# Patient Record
Sex: FEMALE | Race: WHITE | NOT HISPANIC OR LATINO | Employment: OTHER
[De-identification: names, ages, dates, MRNs, and addresses within clinical notes are randomized per-mention and may not be internally consistent; named-entity substitution may affect disease eponyms.]

---

## 2018-04-17 ENCOUNTER — TRANSCRIBE ORDERS (OUTPATIENT)
Dept: SCHEDULING | Age: 58
End: 2018-04-17

## 2018-04-17 DIAGNOSIS — M81.0 SENILE OSTEOPOROSIS: Primary | ICD-10-CM

## 2018-05-15 ENCOUNTER — HOSPITAL ENCOUNTER (OUTPATIENT)
Dept: RADIOLOGY | Age: 58
Discharge: HOME | End: 2018-05-15
Attending: INTERNAL MEDICINE
Payer: COMMERCIAL

## 2018-05-15 DIAGNOSIS — M81.0 SENILE OSTEOPOROSIS: ICD-10-CM

## 2018-05-15 PROCEDURE — 77080 DXA BONE DENSITY AXIAL: CPT

## 2018-08-08 ENCOUNTER — APPOINTMENT (RX ONLY)
Dept: URBAN - METROPOLITAN AREA CLINIC 31 | Facility: CLINIC | Age: 58
Setting detail: DERMATOLOGY
End: 2018-08-08

## 2018-08-08 DIAGNOSIS — L29.8 OTHER PRURITUS: ICD-10-CM

## 2018-08-08 DIAGNOSIS — L29.89 OTHER PRURITUS: ICD-10-CM

## 2018-08-08 DIAGNOSIS — L57.0 ACTINIC KERATOSIS: ICD-10-CM

## 2018-08-08 PROBLEM — D48.5 NEOPLASM OF UNCERTAIN BEHAVIOR OF SKIN: Status: ACTIVE | Noted: 2018-08-08

## 2018-08-08 PROCEDURE — ? COUNSELING

## 2018-08-08 PROCEDURE — 11100: CPT

## 2018-08-08 PROCEDURE — 11101: CPT

## 2018-08-08 PROCEDURE — ? RECOMMENDATIONS

## 2018-08-08 PROCEDURE — ? BIOPSY BY SHAVE METHOD

## 2018-08-08 PROCEDURE — ? PRESCRIPTION SAMPLES PROVIDED

## 2018-08-08 PROCEDURE — 99213 OFFICE O/P EST LOW 20 MIN: CPT | Mod: 25

## 2018-08-08 ASSESSMENT — LOCATION ZONE DERM
LOCATION ZONE: FACE
LOCATION ZONE: TRUNK

## 2018-08-08 ASSESSMENT — LOCATION DETAILED DESCRIPTION DERM
LOCATION DETAILED: LEFT CENTRAL MALAR CHEEK
LOCATION DETAILED: LEFT LATERAL MALAR CHEEK
LOCATION DETAILED: LEFT AREOLA

## 2018-08-08 ASSESSMENT — LOCATION SIMPLE DESCRIPTION DERM
LOCATION SIMPLE: LEFT CHEEK
LOCATION SIMPLE: LEFT BREAST

## 2018-08-08 NOTE — HPI: EVALUATION OF SKIN LESION(S)
Hpi Title: Evaluation of a Skin Lesion
(0) understands/communicates without difficulty
How Severe Are Your Spot(S)?: mild
Have Your Spot(S) Been Treated In The Past?: has been treated
Have Your Spot(S) Been Treated In The Past?: has not been treated

## 2018-08-08 NOTE — PROCEDURE: RECOMMENDATIONS
Detail Level: Zone
Recommendation Preamble: The following recommendations were made during the visit: sarna OTC

## 2018-08-08 NOTE — PROCEDURE: BIOPSY BY SHAVE METHOD
Depth Of Biopsy: dermis
Consent: Written consent was obtained and risks were reviewed including but not limited to scarring, infection, bleeding, scabbing, incomplete removal, nerve damage and allergy to anesthesia.
Detail Level: Detailed
Type Of Destruction Used: Curettage
Destruction After The Procedure: No
Biopsy Method: double edge Personna blade
Additional Anesthesia Volume In Cc (Will Not Render If 0): 0
Biopsy Type: H and E
Body Location Override (Optional - Billing Will Still Be Based On Selected Body Map Location If Applicable): left central malar cheek medial
Wound Care: Petrolatum
Curettage Text: The wound bed was treated with curettage after the biopsy was performed.
Notification Instructions: Patient will be notified of biopsy results. However, patient instructed to call the office if not contacted within 2 weeks.
Billing Type: Third-Party Bill
Cryotherapy Text: The wound bed was treated with cryotherapy after the biopsy was performed.
Electrodesiccation And Curettage Text: The wound bed was treated with electrodesiccation and curettage after the biopsy was performed.
Electrodesiccation Text: The wound bed was treated with electrodesiccation after the biopsy was performed.
Anesthesia Volume In Cc (Will Not Render If 0): 0.3
Render Post-Care Instructions In Note?: yes
Size Of Lesion In Cm: 0.2
Anesthesia Type: 1% lidocaine with epinephrine
Hemostasis: Drysol
Silver Nitrate Text: The wound bed was treated with silver nitrate after the biopsy was performed.
Lab: -20
Dressing: bandage
Lab Facility: [id number]
Post-Care Instructions: I reviewed with the patient in detail post-care instructions. Patient is to keep the biopsy covered and apply vaseline twice daily until healed.
Body Location Override (Optional - Billing Will Still Be Based On Selected Body Map Location If Applicable): left central malar cheek lateral
Size Of Lesion In Cm: 1

## 2019-03-20 ENCOUNTER — APPOINTMENT (RX ONLY)
Dept: URBAN - METROPOLITAN AREA CLINIC 31 | Facility: CLINIC | Age: 59
Setting detail: DERMATOLOGY
End: 2019-03-20

## 2019-03-20 DIAGNOSIS — D18.0 HEMANGIOMA: ICD-10-CM

## 2019-03-20 DIAGNOSIS — Z85.828 PERSONAL HISTORY OF OTHER MALIGNANT NEOPLASM OF SKIN: ICD-10-CM

## 2019-03-20 DIAGNOSIS — L20.89 OTHER ATOPIC DERMATITIS: ICD-10-CM

## 2019-03-20 DIAGNOSIS — Z87.2 PERSONAL HISTORY OF DISEASES OF THE SKIN AND SUBCUTANEOUS TISSUE: ICD-10-CM

## 2019-03-20 DIAGNOSIS — L82.1 OTHER SEBORRHEIC KERATOSIS: ICD-10-CM

## 2019-03-20 DIAGNOSIS — L81.4 OTHER MELANIN HYPERPIGMENTATION: ICD-10-CM

## 2019-03-20 DIAGNOSIS — D22 MELANOCYTIC NEVI: ICD-10-CM

## 2019-03-20 PROBLEM — D22.5 MELANOCYTIC NEVI OF TRUNK: Status: ACTIVE | Noted: 2019-03-20

## 2019-03-20 PROBLEM — L20.84 INTRINSIC (ALLERGIC) ECZEMA: Status: ACTIVE | Noted: 2019-03-20

## 2019-03-20 PROBLEM — D18.01 HEMANGIOMA OF SKIN AND SUBCUTANEOUS TISSUE: Status: ACTIVE | Noted: 2019-03-20

## 2019-03-20 PROCEDURE — ? ADDITIONAL NOTES

## 2019-03-20 PROCEDURE — ? INVENTORY

## 2019-03-20 PROCEDURE — ? PRESCRIPTION SAMPLES PROVIDED

## 2019-03-20 PROCEDURE — ? COUNSELING

## 2019-03-20 PROCEDURE — 99214 OFFICE O/P EST MOD 30 MIN: CPT

## 2019-03-20 ASSESSMENT — LOCATION DETAILED DESCRIPTION DERM
LOCATION DETAILED: RIGHT PROXIMAL DORSAL FOREARM
LOCATION DETAILED: SUPERIOR THORACIC SPINE
LOCATION DETAILED: INFERIOR THORACIC SPINE
LOCATION DETAILED: EPIGASTRIC SKIN
LOCATION DETAILED: LEFT CENTRAL MALAR CHEEK

## 2019-03-20 ASSESSMENT — LOCATION ZONE DERM
LOCATION ZONE: ARM
LOCATION ZONE: FACE
LOCATION ZONE: TRUNK

## 2019-03-20 ASSESSMENT — LOCATION SIMPLE DESCRIPTION DERM
LOCATION SIMPLE: UPPER BACK
LOCATION SIMPLE: RIGHT FOREARM
LOCATION SIMPLE: ABDOMEN
LOCATION SIMPLE: LEFT CHEEK

## 2019-03-20 NOTE — PROCEDURE: ADDITIONAL NOTES
Additional Notes: Pt had pathology proven AK’s from last visit on face treated by tip derm.
Detail Level: Simple

## 2020-01-14 ENCOUNTER — APPOINTMENT (RX ONLY)
Dept: URBAN - METROPOLITAN AREA CLINIC 31 | Facility: CLINIC | Age: 60
Setting detail: DERMATOLOGY
End: 2020-01-14

## 2020-01-14 DIAGNOSIS — L57.0 ACTINIC KERATOSIS: ICD-10-CM

## 2020-01-14 DIAGNOSIS — L81.4 OTHER MELANIN HYPERPIGMENTATION: ICD-10-CM

## 2020-01-14 DIAGNOSIS — L82.0 INFLAMED SEBORRHEIC KERATOSIS: ICD-10-CM

## 2020-01-14 DIAGNOSIS — L20.89 OTHER ATOPIC DERMATITIS: ICD-10-CM

## 2020-01-14 PROBLEM — L20.84 INTRINSIC (ALLERGIC) ECZEMA: Status: ACTIVE | Noted: 2020-01-14

## 2020-01-14 PROCEDURE — ? PRESCRIPTION

## 2020-01-14 PROCEDURE — ? COUNSELING

## 2020-01-14 PROCEDURE — ? PHOTO-DOCUMENTATION

## 2020-01-14 PROCEDURE — ? OBSERVATION

## 2020-01-14 PROCEDURE — ? LIQUID NITROGEN

## 2020-01-14 PROCEDURE — 17110 DESTRUCTION B9 LES UP TO 14: CPT

## 2020-01-14 PROCEDURE — 99213 OFFICE O/P EST LOW 20 MIN: CPT | Mod: 25

## 2020-01-14 PROCEDURE — 17000 DESTRUCT PREMALG LESION: CPT | Mod: 59

## 2020-01-14 PROCEDURE — 17003 DESTRUCT PREMALG LES 2-14: CPT | Mod: 59

## 2020-01-14 PROCEDURE — ? PRESCRIPTION SAMPLES PROVIDED

## 2020-01-14 RX ORDER — TRIAMCINOLONE ACETONIDE 1 MG/G
CREAM TOPICAL BID
Qty: 1 | Refills: 1 | Status: ERX | COMMUNITY
Start: 2020-01-14

## 2020-01-14 RX ADMIN — TRIAMCINOLONE ACETONIDE: 1 CREAM TOPICAL at 00:00

## 2020-01-14 ASSESSMENT — LOCATION DETAILED DESCRIPTION DERM
LOCATION DETAILED: LEFT SUPERIOR CENTRAL MALAR CHEEK
LOCATION DETAILED: NASAL DORSUM
LOCATION DETAILED: LEFT SUPERIOR UPPER BACK
LOCATION DETAILED: LEFT NASAL SIDEWALL
LOCATION DETAILED: LEFT INFERIOR VERMILION LIP
LOCATION DETAILED: RIGHT PROXIMAL DORSAL FOREARM
LOCATION DETAILED: NASAL TIP

## 2020-01-14 ASSESSMENT — LOCATION ZONE DERM
LOCATION ZONE: FACE
LOCATION ZONE: LIP
LOCATION ZONE: NOSE
LOCATION ZONE: ARM
LOCATION ZONE: TRUNK

## 2020-01-14 ASSESSMENT — LOCATION SIMPLE DESCRIPTION DERM
LOCATION SIMPLE: LEFT LIP
LOCATION SIMPLE: RIGHT FOREARM
LOCATION SIMPLE: NOSE
LOCATION SIMPLE: LEFT UPPER BACK
LOCATION SIMPLE: LEFT NOSE
LOCATION SIMPLE: LEFT CHEEK

## 2020-01-14 NOTE — PROCEDURE: LIQUID NITROGEN
Render Post-Care Instructions In Note?: yes
Post-Care Instructions: I reviewed with the patient in detail post-care instructions. Patient is to wear sunprotection, and avoid picking at any of the treated lesions. Pt may apply Vaseline to crusted or scabbing areas.
Detail Level: Detailed
Duration Of Freeze Thaw-Cycle (Seconds): 5
Render Note In Bullet Format When Appropriate: No
Consent: The patient's consent was obtained including but not limited to risks of crusting, scabbing, blistering, scarring, darker or lighter pigmentary change, recurrence, incomplete removal and infection.
Number Of Freeze-Thaw Cycles: 1 freeze-thaw cycle
Medical Necessity Clause: This procedure was medically necessary because the lesions that were treated were:
Duration Of Freeze Thaw-Cycle (Seconds): 5-10
Medical Necessity Information: It is in your best interest to select a reason for this procedure from the list below. All of these items fulfill various CMS LCD requirements except the new and changing color options.
Detail Level: Simple

## 2020-01-14 NOTE — PROCEDURE: PHOTO-DOCUMENTATION
Photo Preface (Leave Blank If You Do Not Want): Photographs were obtained today
Detail Level: Zone
Details (Free Text): 2 mm

## 2020-02-11 ENCOUNTER — APPOINTMENT (RX ONLY)
Dept: URBAN - METROPOLITAN AREA CLINIC 31 | Facility: CLINIC | Age: 60
Setting detail: DERMATOLOGY
End: 2020-02-11

## 2020-02-11 DIAGNOSIS — D485 NEOPLASM OF UNCERTAIN BEHAVIOR OF SKIN: ICD-10-CM

## 2020-02-11 DIAGNOSIS — L57.0 ACTINIC KERATOSIS: ICD-10-CM

## 2020-02-11 PROBLEM — D48.5 NEOPLASM OF UNCERTAIN BEHAVIOR OF SKIN: Status: ACTIVE | Noted: 2020-02-11

## 2020-02-11 PROCEDURE — 17003 DESTRUCT PREMALG LES 2-14: CPT

## 2020-02-11 PROCEDURE — ? LIQUID NITROGEN

## 2020-02-11 PROCEDURE — 11102 TANGNTL BX SKIN SINGLE LES: CPT

## 2020-02-11 PROCEDURE — 17000 DESTRUCT PREMALG LESION: CPT | Mod: 59

## 2020-02-11 PROCEDURE — ? COUNSELING

## 2020-02-11 PROCEDURE — ? BIOPSY BY SHAVE METHOD

## 2020-02-11 ASSESSMENT — LOCATION ZONE DERM
LOCATION ZONE: FACE
LOCATION ZONE: TRUNK
LOCATION ZONE: NOSE

## 2020-02-11 ASSESSMENT — LOCATION SIMPLE DESCRIPTION DERM
LOCATION SIMPLE: LEFT UPPER BACK
LOCATION SIMPLE: LEFT NOSE
LOCATION SIMPLE: NOSE
LOCATION SIMPLE: LEFT CHEEK

## 2020-02-11 ASSESSMENT — LOCATION DETAILED DESCRIPTION DERM
LOCATION DETAILED: NASAL DORSUM
LOCATION DETAILED: LEFT INFERIOR MEDIAL MALAR CHEEK
LOCATION DETAILED: NASAL ROOT
LOCATION DETAILED: LEFT NASAL SIDEWALL
LOCATION DETAILED: LEFT SUPERIOR MEDIAL UPPER BACK

## 2020-02-11 NOTE — PROCEDURE: LIQUID NITROGEN
Render Note In Bullet Format When Appropriate: No
Number Of Freeze-Thaw Cycles: 1 freeze-thaw cycle
Post-Care Instructions: I reviewed with the patient in detail post-care instructions. Patient is to wear sunprotection, and avoid picking at any of the treated lesions. Pt may apply Vaseline to crusted or scabbing areas.
Duration Of Freeze Thaw-Cycle (Seconds): 5
Detail Level: Detailed
Render Post-Care Instructions In Note?: yes
Consent: The patient's consent was obtained including but not limited to risks of crusting, scabbing, blistering, scarring, darker or lighter pigmentary change, recurrence, incomplete removal and infection.

## 2020-02-11 NOTE — PROCEDURE: COUNSELING
Detail Level: Zone
Patient Specific Counseling (Will Not Stick From Patient To Patient): **Discussed topical efudex and calcipotriene, patient declined at this time and will consider at next appt if necessary.**

## 2020-05-05 ENCOUNTER — HOSPITAL ENCOUNTER (OUTPATIENT)
Facility: HOSPITAL | Age: 60
Discharge: HOME | End: 2020-05-05
Attending: INTERNAL MEDICINE | Admitting: INTERNAL MEDICINE
Payer: COMMERCIAL

## 2020-05-05 VITALS
BODY MASS INDEX: 24.29 KG/M2 | DIASTOLIC BLOOD PRESSURE: 61 MMHG | TEMPERATURE: 98 F | RESPIRATION RATE: 18 BRPM | HEIGHT: 62 IN | SYSTOLIC BLOOD PRESSURE: 97 MMHG | HEART RATE: 78 BPM | OXYGEN SATURATION: 97 % | WEIGHT: 132 LBS

## 2020-05-05 DIAGNOSIS — R91.8 PULMONARY NODULES: ICD-10-CM

## 2020-05-05 DIAGNOSIS — R59.0 MEDIASTINAL LYMPHADENOPATHY: ICD-10-CM

## 2020-05-05 PROCEDURE — 87116 MYCOBACTERIA CULTURE: CPT | Performed by: HOSPITALIST

## 2020-05-05 PROCEDURE — 87102 FUNGUS ISOLATION CULTURE: CPT | Performed by: HOSPITALIST

## 2020-05-05 PROCEDURE — 87206 SMEAR FLUORESCENT/ACID STAI: CPT | Performed by: HOSPITALIST

## 2020-05-05 PROCEDURE — 87176 TISSUE HOMOGENIZATION CULTR: CPT | Performed by: HOSPITALIST

## 2020-05-05 PROCEDURE — 07D78ZX EXTRACTION OF THORAX LYMPHATIC, VIA NATURAL OR ARTIFICIAL OPENING ENDOSCOPIC, DIAGNOSTIC: ICD-10-PCS | Performed by: INTERNAL MEDICINE

## 2020-05-05 PROCEDURE — 25800000 HC PHARMACY IV SOLUTIONS: Performed by: INTERNAL MEDICINE

## 2020-05-05 PROCEDURE — 36100169

## 2020-05-05 PROCEDURE — 88185 FLOWCYTOMETRY/TC ADD-ON: CPT | Mod: 59 | Performed by: HOSPITALIST

## 2020-05-05 PROCEDURE — 27200000 HC STERILE SUPPLY

## 2020-05-05 PROCEDURE — BB4CZZZ ULTRASONOGRAPHY OF MEDIASTINUM: ICD-10-PCS | Performed by: INTERNAL MEDICINE

## 2020-05-05 PROCEDURE — 0BJ08ZZ INSPECTION OF TRACHEOBRONCHIAL TREE, VIA NATURAL OR ARTIFICIAL OPENING ENDOSCOPIC: ICD-10-PCS | Performed by: INTERNAL MEDICINE

## 2020-05-05 PROCEDURE — 36000049 HC BRONCH EBUS SAMPLING 1/2 NODE

## 2020-05-05 PROCEDURE — 87206 SMEAR FLUORESCENT/ACID STAI: CPT | Mod: 59 | Performed by: HOSPITALIST

## 2020-05-05 RX ORDER — LIDOCAINE HYDROCHLORIDE 40 MG/ML
5 SOLUTION TOPICAL ONCE
Status: DISCONTINUED | OUTPATIENT
Start: 2020-05-05 | End: 2020-05-06 | Stop reason: HOSPADM

## 2020-05-05 RX ORDER — VIT C/E/ZN/COPPR/LUTEIN/ZEAXAN 250MG-90MG
5000 CAPSULE ORAL DAILY
COMMUNITY
End: 2023-07-14

## 2020-05-05 RX ORDER — SODIUM CHLORIDE 9 MG/ML
INJECTION, SOLUTION INTRAVENOUS CONTINUOUS
Status: DISCONTINUED | OUTPATIENT
Start: 2020-05-05 | End: 2020-05-06 | Stop reason: HOSPADM

## 2020-05-05 RX ADMIN — SODIUM CHLORIDE: 9 INJECTION, SOLUTION INTRAVENOUS at 10:31

## 2020-05-05 NOTE — PROCEDURES
EBUS Bronchoscopy Report          Date: 5/5/2020    Pre-Procedure Diagnosis: Mediastinal lymphadenopathy    Post-Procedure Diagnosis: same    Procedure Start Time: 13:18    Procedure End Time:  14:02    Attending Physician: Dr. Graciela Glover DO    Sedation utilized throughout the procedure: 100 mcg of IV Fentanyl, 9 mg of IV Midazolam, and 50mg of IV Benadryl were utilized throughout the procedure for conscious sedation. 4 cc of 4% Lidocaine was utilized to anesthetize the posterior pharynx and 20 cc of 2% lidocaine was utilized to anesthetize the airways.     Summary of Procedure:  All risks and benefits of the procedure were explained in detail to the patient and consent was obtained and can be found on the chart. The procedure took place in [facility]'s bronchoscopy suite. IV access was obtained. O2 via nasal oxygen was administered continuously. Vital signs were monitored throughout the procedure. A timeout was performed. After the patient was lightly sedated, a bite block was placed into the patient's mouth. The EBUS bronchoscope was passed through said bite block down to the area of the posterior pharynx and then to the epiglottis with ease. Topical lidocaine was applied to the epiglottis and vocal cords x4. The vocal cords appeared normal. Vocal cords were entered with ease. Topical lidocaine was applied to the immediate subglottic region. Bronchoscope was then passed to the brian, which appeared crisp. Topical lidocaine was applied to the biran as well.     The EBUS bronchoscope was positioned into the area of the proximal trachea. Utilizing endobronchial ultrasound, a 2R node was visualized. This lymph node was 8 mm in largest dimension, homogenous in echotecture, and round shaped. Transbronchial needle aspirations were performed to this area with 2 passes.    The EBUS bronchoscope was positioned into the area of the distal trachea. Utilizing endobronchial ultrasound, a conglomeration  of 4-5 lymph nodes at the 4R station were visualized. The largest node was 15 mm in dimension, homogenous in echotecture, and round shaped. Transbronchial needle aspirations were performed to this area with 6 passes.    Remainder of a full mediastinal/hilar lymph node EBUS survey did not reveal any other enlarged lymph nodes >5 mm.    The EBUS bronchoscope was then removed and the flexible fiberoptic bronchoscope was inserted through the cords with ease. A full airway exam of the entire tracheobronchial tree was performed with excellent visualization. Airway exam revealed normal mucosa throughout without any evidence of cobble-stoning. No endobronchial lesions, secretions, or bleeding was noted. There was note of an accessory RUL airway.    There was no active bleeding at procedures end. Both basilar segments were lightly suctioned for any secretions that dripped down during the procedure. The bronchoscope was then removed. The patient tolerated the procedure well.    Estimated Blood Loss: <5cc    Complications: None        Impression:    1. Mediastinal adenopathy:     Lymph node stations assessed:  - Level 2R, 8 mm in largest dimension, biopsied  - Level 4R, 15 mm in largest dimension, biopsied  - No other pathologically enlarged mediastinal/hilar lymph nodes >5mm on full EBUS survey  -Lymph node specimen sent for cytologic analysis with CD4:CD8 ratio in addition to flow cytometry and culture.     2. Normal airway exam.      Obdulio Gallegos DO  Pulmonary / Critical Care Fellow  PGY-6

## 2020-05-05 NOTE — DISCHARGE INSTRUCTIONS
Bronchoscopy Discharge Instructions          1. You had a bronchoscopy today and since IV sedation/anesthesia was used, you may feel drowsy. You are not permitted to drive today, nor have any alcoholic beverages. Rest for the remainder of the day. You may resumed these activities tomorrow.    Occasionally, the arm gets sore from the intravenous site. Use cold compresses today, followed by warm compresses tomorrow and as along as needed. If a red streak develops from the site, please notify your physician.    2. You may eat at 3PM. Begin by taking a small sip of water. If you do not cough, you may eat and drink. Please chew your food well and eat slowly.    3. You may see streaks of blood in your sputum for 1-2 days; this is normal.     DANGER SIGNALS: If this increases or you develop chest pain, shortness of breath, a temperature greater than 102, or any other symptoms, call your doctor.    4. Medications: See Medication Reconciliation List    5. If a problem occurs after discharge notify Dr. Glover at [phone].    6. Call Dr. Ryan for the results of your bronchoscopy in 48-72 hours at [phone].    7. Follow up appointment: Per Dr. Ryan    8. These discharge instructions have been explained to the patient/family. I have received and understand the above instructions.    Obdulio Gallegos, DO  05/05/20

## 2020-05-05 NOTE — PRE-PROCEDURE NOTE
I am the primary operating bronchoscopist and I have identified the patient on 5/5/2020 @ 1pm  Graciela Glover DO [facility]  Phone [phone]

## 2020-05-06 LAB
FUNGUS STAIN: NORMAL
RHODAMINE-AURAMINE STN SPEC: NORMAL

## 2020-05-08 LAB
CASE RPRT: NORMAL
CLINICAL INFO: NORMAL
PATH REPORT.ADDENDUM SPEC: NORMAL
PATH REPORT.ADDENDUM SPEC: NORMAL
PATH REPORT.FINAL DX SPEC: NORMAL
PATH REPORT.FINAL DX SPEC: NORMAL
PATH REPORT.GROSS SPEC: NORMAL

## 2020-05-09 LAB
GRAM STN SPEC: ABNORMAL
GRAM STN SPEC: ABNORMAL
MICROORGANISM SPEC CULT: ABNORMAL

## 2020-05-22 ENCOUNTER — TELEPHONE (OUTPATIENT)
Dept: SCHEDULING | Facility: CLINIC | Age: 60
End: 2020-05-22

## 2020-05-22 NOTE — TELEPHONE ENCOUNTER
New Patient Appointment Request    Name of caller:Monica    Relationship to patient: self    Diagnosis: sarcoid lung disease    Referred by: Pulmonologist (Dr German Ryan/ Dr Bonnie Glover) advised pt to see cardiologist-pt found [facility] online    Previous Cardiologist:none    Best contact number: [phone]    Additional notes:Pt had lung procedure at [facility] and liked the hospital-would like to start following cardiac care at [facility]-pt would like female cardiologist

## 2020-06-01 ENCOUNTER — TRANSCRIBE ORDERS (OUTPATIENT)
Dept: SCHEDULING | Age: 60
End: 2020-06-01

## 2020-06-01 DIAGNOSIS — M81.0 AGE-RELATED OSTEOPOROSIS WITHOUT CURRENT PATHOLOGICAL FRACTURE: Primary | ICD-10-CM

## 2020-06-02 ENCOUNTER — HOSPITAL ENCOUNTER (OUTPATIENT)
Dept: RADIOLOGY | Age: 60
Discharge: HOME | End: 2020-06-02
Attending: INTERNAL MEDICINE
Payer: COMMERCIAL

## 2020-06-02 DIAGNOSIS — M81.0 AGE-RELATED OSTEOPOROSIS WITHOUT CURRENT PATHOLOGICAL FRACTURE: ICD-10-CM

## 2020-06-02 LAB — FUNGUS SPEC CULT: NORMAL

## 2020-06-02 PROCEDURE — 77080 DXA BONE DENSITY AXIAL: CPT

## 2020-06-17 LAB — MYCOBACTERIUM SPEC CULT: NORMAL

## 2020-06-25 NOTE — PROGRESS NOTES
Daphney Ho MD, [facility]  Cardiology    [facility]    [facility]  [facility] [address]  [address] [place] [address]    TEL  [phone]  [facility]     2020    Reason for visit: Cardiovascular consultation.    Monica Loja is a 60 y.o. female who presents for cardiovascular consultation.  History is obtained from the patient and extensive review of all available medical records.    Monica has a history of hyperlipidemia and recently underwent coronary calcium score in February that showed no evidence of atherosclerosis (report obtained after patient visit), however did show abnormal interstitial changes and lymphadenopathy.  She was seen by Dr. Ryan in pulmonology and ultimately underwent endobronchial ultrasound-guided transbronchial needle aspiration for mediastinal adenopathy on May 5, 2020 with Dr. Glover. 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  She was diagnosed with pulmonary sarcoidosis and cardiovascular consultation was recommended.    Monica reports feeling well from a cardiovascular standpoint.  She is without chest pain, shortness of breath, orthopnea, PND, lower extremity edema, palpitations, dizziness, lightheadedness, or syncope.  She notes only one episode of vertigo 2019 otherwise has had no concerning symptoms.    She reports heart healthy diet, incorporating fresh fruits and veggies, whole grains and minimizing processed foods.  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  She denies cardiovascular symptom or limitation with exertion.    Postmenopausal, completing around age 47.  Pregnancy history is significant for 1  delivery, however she denies hypertensive or diabetic complications.    Past Medical History:  1. Pulmonary sarcoidosis  2. Hyperlipidemia  3. Osteoporosis  4. Premature menopause  5. Obstetric history:    labor    Past Surgical History:  1. Tonsillectomy  2. Pulmonary biopsy    Medications: denosumab SQ as directed, vitamin D, fish oil.    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     Family History: Mother has hyperlipidemia and diabetes.     Review of Systems   Constitution: Negative for malaise/fatigue and weight gain.   HENT: Negative for nosebleeds.    Eyes: Negative for vision loss in left eye, vision loss in right eye and visual disturbance.   Cardiovascular: Negative for chest pain, cyanosis, dyspnea on exertion, irregular heartbeat, leg swelling, near-syncope, orthopnea, palpitations, paroxysmal nocturnal dyspnea and syncope.   Respiratory: Negative for cough, shortness of breath, snoring and wheezing.    Endocrine: Negative for polyuria.   Hematologic/Lymphatic: Negative for bleeding problem. Does not bruise/bleed easily.   Skin: Negative for rash.   Musculoskeletal: Negative for arthritis and myalgias.   Gastrointestinal: Negative for abdominal pain, hematochezia, melena, nausea and vomiting.   Genitourinary: Negative for hematuria.   Neurological: Negative for dizziness and light-headedness.   Psychiatric/Behavioral: Negative for altered mental status and depression.       Exam:  Objective   Vitals:    06/26/20 0749   BP: 108/68   Pulse: 62     Body mass index is 24.4 kg/m².  Physical Exam   Constitutional: She appears well-developed and well-nourished. No distress.   HENT:   Head: Normocephalic.   Mouth/Throat: Mucous membranes are normal. Mucous membranes are not cyanotic.   Eyes: Conjunctivae are normal.   Neck: No JVD present. Carotid bruit is not present.   Cardiovascular: Normal rate, regular rhythm, S1 normal and S2 normal.  No extrasystoles are present. Exam reveals no gallop.   No murmur heard.  Pulses:       Carotid pulses are 2+ on the right side, and 2+ on the left side.       Radial pulses are 2+ on the  right side, and 2+ on the left side.   Pulmonary/Chest: Effort normal. No respiratory distress. She has no wheezes. She has no rales.   Abdominal: Soft. Bowel sounds are normal. There is no tenderness.   Musculoskeletal: She exhibits no edema.   Lymphadenopathy:     She has no cervical adenopathy.   Neurological: She is alert.   Skin: Skin is warm and dry. No cyanosis.   Psychiatric: She has a normal mood and affect. Her speech is normal.       Labs:  No results found for: WBC, HGB, PLT, CHOL, TRIG, HDL, LDL, LDLDIRECT, ALT, AST, NA, K, CREATININE, TSH, INR, HGBA1C, MICROALBUR   From 2/19/20: total cholesterol 255, triglycerides 98, HDL 69, , TSH 4.96, AST 19, ALT 17, sodium 140, potassium 4.2, BUN 12, creatinine 0.98.  Personally reviewed, my interpretation: elevated LDL.     Cardiovascular Studies:   1.  Coronary calcium score 2/27/2020: Coronary calcium score of 0.    ECG from today personally reviewed and discussed with the patient shows rhythm, poor R wave progression.      Assessment/Plan   Problem List Items Addressed This Visit        Endocrine/Metabolic    Mixed hyperlipidemia     Patient reports hyperlipidemia, which prompted coronary calcium score.   From 2/19/20: total cholesterol 255, triglycerides 98, HDL 69, .  Elevated LDL.  Coronary calcium score 2/27/2020: Composite 0.  Estimated 1-year risk of ASCVD 2.2%. Statin therapy not currently indicated.  --Lifestyle modification discussed at length, dietary changes stressed.            Infectious/Inflammatory    Sarcoidosis - Primary     Pulmonary sarcoidosis diagnosed with bronchoscopy.  No symptoms to suggest cardiac involvement.  Abnormal ECG therefore we will obtain an echocardiogram. Low threshold for cardiac MRI with any abnormal findings or symptoms.  --Echocardiogram.  --Reviewed concerning cardiac symptoms, call with any.         Relevant Orders    ECG 12 LEAD-OFFICE PERFORMED (Completed)    Transthoracic echo (TTE) complete        Other    Abnormal ECG     ECG from today shows sinus rhythm with poor R wave progression, mostly negative in leads V1-V2.  Likely secondary to lead placement, however particularly given new diagnosis of sarcoidosis I have recommended an echocardiogram.  --Echocardiogram.         Relevant Orders    Transthoracic echo (TTE) complete             This letter was generated using speech recognition software. Please excuse any typographical errors.    Return in about 1 year (around 6/26/2021).        Daphney Ho MD, FACC

## 2020-06-26 ENCOUNTER — OFFICE VISIT (OUTPATIENT)
Dept: CARDIOLOGY | Facility: CLINIC | Age: 60
End: 2020-06-26
Payer: COMMERCIAL

## 2020-06-26 ENCOUNTER — TELEPHONE (OUTPATIENT)
Dept: CARDIOLOGY | Facility: CLINIC | Age: 60
End: 2020-06-26

## 2020-06-26 VITALS
BODY MASS INDEX: 24.55 KG/M2 | SYSTOLIC BLOOD PRESSURE: 108 MMHG | WEIGHT: 133.4 LBS | DIASTOLIC BLOOD PRESSURE: 68 MMHG | HEIGHT: 62 IN | HEART RATE: 62 BPM

## 2020-06-26 DIAGNOSIS — R94.31 ABNORMAL ECG: ICD-10-CM

## 2020-06-26 DIAGNOSIS — D86.9 SARCOIDOSIS: Primary | ICD-10-CM

## 2020-06-26 DIAGNOSIS — E78.2 MIXED HYPERLIPIDEMIA: ICD-10-CM

## 2020-06-26 PROCEDURE — 93000 ELECTROCARDIOGRAM COMPLETE: CPT | Performed by: INTERNAL MEDICINE

## 2020-06-26 PROCEDURE — 99244 OFF/OP CNSLTJ NEW/EST MOD 40: CPT | Performed by: INTERNAL MEDICINE

## 2020-06-26 ASSESSMENT — ENCOUNTER SYMPTOMS
LIGHT-HEADEDNESS: 0
LEFT EYE: 0
SHORTNESS OF BREATH: 0
MYALGIAS: 0
SYNCOPE: 0
DYSPNEA ON EXERTION: 0
WEIGHT GAIN: 0
PALPITATIONS: 0
ORTHOPNEA: 0
WHEEZING: 0
RIGHT EYE: 0
COUGH: 0
BRUISES/BLEEDS EASILY: 0
DIZZINESS: 0
DEPRESSION: 0
HEMATURIA: 0
ALTERED MENTAL STATUS: 0
HEMATOCHEZIA: 0
NEAR-SYNCOPE: 0
SNORING: 0
PND: 0
NAUSEA: 0
ABDOMINAL PAIN: 0
IRREGULAR HEARTBEAT: 0
VOMITING: 0

## 2020-06-26 NOTE — TELEPHONE ENCOUNTER
Call patient after calcium score received, 0 estimation point no indication for statin however lifestyle modification stressed.

## 2020-06-26 NOTE — PATIENT INSTRUCTIONS
Have echocardiogram  Let's try to get calcium score! If you do not hear from me with results in 1-2 weeks, please call me.

## 2020-06-26 NOTE — LETTER
June 26, 2020     German Ryan MD  [address]  [address]  [facility] [address]    Patient: Monica Loja  YOB: 1960  Date of Visit: 6/26/2020      Dear Dr. Ryan:    Thank you for referring Monica Loja to me for evaluation. Below are my notes for this consultation.    If you have questions, please do not hesitate to call me. I look forward to following your patient along with you.         Sincerely,        Daphney Ho MD        CC: MD Georgia Lainez Katie M, MD  6/26/2020 11:13 AM  Sign at close encounter   Daphney Ho MD, [facility]  Cardiology    [facility] HEART [facility]  [facility] Level  [address] [place] [address]    TEL  [phone]  [facility]     June 26, 2020    Reason for visit: Cardiovascular consultation.    Monica Loja is a 60 y.o. female who presents for cardiovascular consultation.  History is obtained from the patient and extensive review of all available medical records.    Monica has a history of hyperlipidemia and recently underwent coronary calcium score in February that showed no evidence of atherosclerosis (report obtained after patient visit), however did show abnormal interstitial changes and lymphadenopathy.  She was seen by Dr. Ryan in pulmonology and ultimately underwent endobronchial ultrasound-guided transbronchial needle aspiration for mediastinal adenopathy on May 5, 2020 with Dr. Glover. 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  She was diagnosed with pulmonary sarcoidosis and cardiovascular consultation was recommended.    Monica reports feeling well from a cardiovascular standpoint.  She is without chest pain, shortness of breath, orthopnea, PND, lower extremity edema, palpitations, dizziness, lightheadedness, or syncope.  She notes only one episode of vertigo June 2019 otherwise has had no concerning symptoms.    She reports heart healthy  diet, incorporating fresh fruits and veggies, whole grains and minimizing processed foods.  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  She denies cardiovascular symptom or limitation with exertion.    Postmenopausal, completing around age 47.  Pregnancy history is significant for 1  delivery, however she denies hypertensive or diabetic complications.    Past Medical History:  1. Pulmonary sarcoidosis  2. Hyperlipidemia  3. Osteoporosis  4. Premature menopause  5. Obstetric history:   labor    Past Surgical History:  1. Tonsillectomy  2. Pulmonary biopsy    Medications: denosumab SQ as directed, vitamin D, fish oil.    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     Family History: Mother has hyperlipidemia and diabetes.     Review of Systems   Constitution: Negative for malaise/fatigue and weight gain.   HENT: Negative for nosebleeds.    Eyes: Negative for vision loss in left eye, vision loss in right eye and visual disturbance.   Cardiovascular: Negative for chest pain, cyanosis, dyspnea on exertion, irregular heartbeat, leg swelling, near-syncope, orthopnea, palpitations, paroxysmal nocturnal dyspnea and syncope.   Respiratory: Negative for cough, shortness of breath, snoring and wheezing.    Endocrine: Negative for polyuria.   Hematologic/Lymphatic: Negative for bleeding problem. Does not bruise/bleed easily.   Skin: Negative for rash.   Musculoskeletal: Negative for arthritis and myalgias.   Gastrointestinal: Negative for abdominal pain, hematochezia, melena, nausea and vomiting.   Genitourinary: Negative for hematuria.   Neurological: Negative for dizziness and light-headedness.   Psychiatric/Behavioral: Negative for altered mental status and depression.       Exam:  Objective   Vitals:    20 0749   BP: 108/68   Pulse: 62     Body mass index is 24.4  kg/m².  Physical Exam   Constitutional: She appears well-developed and well-nourished. No distress.   HENT:   Head: Normocephalic.   Mouth/Throat: Mucous membranes are normal. Mucous membranes are not cyanotic.   Eyes: Conjunctivae are normal.   Neck: No JVD present. Carotid bruit is not present.   Cardiovascular: Normal rate, regular rhythm, S1 normal and S2 normal.  No extrasystoles are present. Exam reveals no gallop.   No murmur heard.  Pulses:       Carotid pulses are 2+ on the right side, and 2+ on the left side.       Radial pulses are 2+ on the right side, and 2+ on the left side.   Pulmonary/Chest: Effort normal. No respiratory distress. She has no wheezes. She has no rales.   Abdominal: Soft. Bowel sounds are normal. There is no tenderness.   Musculoskeletal: She exhibits no edema.   Lymphadenopathy:     She has no cervical adenopathy.   Neurological: She is alert.   Skin: Skin is warm and dry. No cyanosis.   Psychiatric: She has a normal mood and affect. Her speech is normal.       Labs:  No results found for: WBC, HGB, PLT, CHOL, TRIG, HDL, LDL, LDLDIRECT, ALT, AST, NA, K, CREATININE, TSH, INR, HGBA1C, MICROALBUR   From 2/19/20: total cholesterol 255, triglycerides 98, HDL 69, , TSH 4.96, AST 19, ALT 17, sodium 140, potassium 4.2, BUN 12, creatinine 0.98.  Personally reviewed, my interpretation: elevated LDL.     Cardiovascular Studies:   1.  Coronary calcium score 2/27/2020: Coronary calcium score of 0.    ECG from today personally reviewed and discussed with the patient shows rhythm, poor R wave progression.      Assessment/Plan   Problem List Items Addressed This Visit        Endocrine/Metabolic    Mixed hyperlipidemia     Patient reports hyperlipidemia, which prompted coronary calcium score.   From 2/19/20: total cholesterol 255, triglycerides 98, HDL 69, .  Elevated LDL.  Coronary calcium score 2/27/2020: Composite 0.  Estimated 1-year risk of ASCVD 2.2%. Statin therapy not currently  indicated.  --Lifestyle modification discussed at length, dietary changes stressed.            Infectious/Inflammatory    Sarcoidosis - Primary     Pulmonary sarcoidosis diagnosed with bronchoscopy.  No symptoms to suggest cardiac involvement.  Abnormal ECG therefore we will obtain an echocardiogram. Low threshold for cardiac MRI with any abnormal findings or symptoms.  --Echocardiogram.  --Reviewed concerning cardiac symptoms, call with any.         Relevant Orders    ECG 12 LEAD-OFFICE PERFORMED (Completed)    Transthoracic echo (TTE) complete       Other    Abnormal ECG     ECG from today shows sinus rhythm with poor R wave progression, mostly negative in leads V1-V2.  Likely secondary to lead placement, however particularly given new diagnosis of sarcoidosis I have recommended an echocardiogram.  --Echocardiogram.         Relevant Orders    Transthoracic echo (TTE) complete             This letter was generated using speech recognition software. Please excuse any typographical errors.    Return in about 1 year (around 6/26/2021).        Daphney Ho MD, FACC

## 2020-06-26 NOTE — ASSESSMENT & PLAN NOTE
Patient reports hyperlipidemia, which prompted coronary calcium score.   From 2/19/20: total cholesterol 255, triglycerides 98, HDL 69, .  Elevated LDL.  Coronary calcium score 2/27/2020: Composite 0.  Estimated 1-year risk of ASCVD 2.2%. Statin therapy not currently indicated.  --Lifestyle modification discussed at length, dietary changes stressed.

## 2020-06-26 NOTE — ASSESSMENT & PLAN NOTE
ECG from today shows sinus rhythm with poor R wave progression, mostly negative in leads V1-V2.  Likely secondary to lead placement, however particularly given new diagnosis of sarcoidosis I have recommended an echocardiogram.  --Echocardiogram.

## 2020-06-26 NOTE — ASSESSMENT & PLAN NOTE
Pulmonary sarcoidosis diagnosed with bronchoscopy.  No symptoms to suggest cardiac involvement.  Abnormal ECG therefore we will obtain an echocardiogram. Low threshold for cardiac MRI with any abnormal findings or symptoms.  --Echocardiogram.  --Reviewed concerning cardiac symptoms, call with any.

## 2020-06-26 NOTE — TELEPHONE ENCOUNTER
Please pre-cert for     Transthoracic echo (TTE) complete    Diagnosis:  Sarcoidosis (D86.9)  Abnormal ECG (R94.31)     Pt not yet scheduled at [facility] [phone]

## 2020-07-16 ENCOUNTER — TELEPHONE (OUTPATIENT)
Dept: CARDIOLOGY | Facility: CLINIC | Age: 60
End: 2020-07-16

## 2020-07-16 ENCOUNTER — HOSPITAL ENCOUNTER (OUTPATIENT)
Dept: CARDIOLOGY | Facility: HOSPITAL | Age: 60
Discharge: HOME | End: 2020-07-16
Attending: INTERNAL MEDICINE
Payer: COMMERCIAL

## 2020-07-16 VITALS
HEART RATE: 57 BPM | SYSTOLIC BLOOD PRESSURE: 98 MMHG | DIASTOLIC BLOOD PRESSURE: 60 MMHG | WEIGHT: 133 LBS | HEIGHT: 62 IN | BODY MASS INDEX: 24.48 KG/M2

## 2020-07-16 DIAGNOSIS — D86.9 SARCOIDOSIS: ICD-10-CM

## 2020-07-16 DIAGNOSIS — R94.31 ABNORMAL ECG: ICD-10-CM

## 2020-07-16 PROCEDURE — 93306 TTE W/DOPPLER COMPLETE: CPT

## 2020-07-16 PROCEDURE — 93306 TTE W/DOPPLER COMPLETE: CPT | Mod: 26 | Performed by: INTERNAL MEDICINE

## 2020-07-16 NOTE — TELEPHONE ENCOUNTER
"Pt was here to have an echo done and she also stopped by the office to ask if it's okay to take the \"RELIEF FACTOR\" Nutritional supplement. I conformed with Dr. Ho and kasey for pt to use this supplement. Meanwhile, I called pt and notified.   "

## 2020-07-20 LAB
AORTIC ROOT ANNULUS - M-MODE: 2.77 CM
BSA FOR ECHO PROCEDURE: 1.62 M2
CUSP SEPARATION: 1.9 CM
DOP CALC LVOT STROKE VOLUME: 45.85 ML
DOP CALC RVOT VTI: 12.89 CM
E WAVE DECELERATION TIME: 192.14 MS
E/A RATIO: 1.46
E/E' RATIO: 8.75
E/LAT E' RATIO: 6
EDV (BP): 50.86 ML
EF (A4C): 50.63 %
EF A2C: 60.14 %
EJECTION FRACTION: 53.62 %
ESV (BP): 23.59 ML
INTERVENTRICULAR SEPTUM: 0.78 CM
LA ESV (BP): 20.44 ML
LA ESV INDEX (A2C): 15.19 ML/M2
LA ESV INDEX (BP): 12.62 ML/M2
LA/AORTA RATIO: 1.15
LAAS-AP2: 11.71 CM2
LAAS-AP4: 7.24 CM2
LAD 2D - M-MODE: 3.17 CM
LAV-S: 24.6 ML
LEFT ATRIUM VOLUME INDEX: 8.33 ML/M2
LEFT ATRIUM VOLUME: 13.5 ML
LEFT INTERNAL DIMENSION IN SYSTOLE: 3.01 CM (ref 2.3–3.48)
LEFT VENTRICLE DIASTOLIC VOLUME INDEX: 29.51 ML/M2
LEFT VENTRICLE DIASTOLIC VOLUME: 47.8 ML
LEFT VENTRICLE MASS INDEX: 53.44 G/M2
LEFT VENTRICLE SYSTOLIC VOLUME INDEX: 14.57 ML/M2
LEFT VENTRICLE SYSTOLIC VOLUME: 23.6 ML
LEFT VENTRICULAR INTERNAL DIMENSION IN DIASTOLE: 3.78 CM (ref 3.87–5.38)
LEFT VENTRICULAR MASS: 86.58 G
LEFT VENTRICULAR POSTERIOR WALL IN END DIASTOLE: 0.84 CM (ref 0.5–0.93)
LV DIASTOLIC VOLUME: 55.2 ML
LV ESV (APICAL 2 CHAMBER): 22 ML
LVCI: 1.1 LITERS PER MINUTE PER SQUARE METER
LVCO: 1.77 LITERS PER MINUTE
LVEDVI(A2C): 34.07 ML/M2
LVEDVI(BP): 31.4 ML/M2
LVESVI(A2C): 13.58 ML/M2
LVESVI(BP): 14.56 ML/M2
LVOT 2D: 1.89 CM
LVOT A: 2.79 CM2
LVOT MG: 1.37 MMHG
LVOT MV: 0.56 M/S
LVOT PEAK VELOCITY: 0.79 M/S
LVOT PG: 2.51 MMHG
LVOT VTI: 16.35 CM
MV E'TISSUE VEL-LAT: 0.1 M/S
MV E'TISSUE VEL-MED: 0.07 M/S
MV PEAK A VEL: 0.42 M/S
MV PEAK E VEL: 0.61 M/S
MV VALVE AREA P 1/2 METHOD: 3.95 CM2
PI PEAK VELOCITY: 1.25 M/S
PULM VEIN S/D RATIO: 1.42
PULMONARY REGURGITATION LATE DIASTOLIC VELOCITY: 0.53 M/S
PV PEAK D VEL: 0.42 M/S
PV PEAK GRADIENT: 1.13 MMHG
PV PEAK S VEL: 0.6 M/S
TRICUSPID VALVE PEAK REGURGITATION VELOCITY: 1.95 M/S
Z-SCORE OF LEFT VENTRICULAR DIMENSION IN END DIASTOLE: -1.88
Z-SCORE OF LEFT VENTRICULAR DIMENSION IN END SYSTOLE: 0.49
Z-SCORE OF LEFT VENTRICULAR POSTERIOR WALL IN END DIASTOLE: 1.09

## 2020-09-10 ENCOUNTER — TRANSCRIBE ORDERS (OUTPATIENT)
Dept: SCHEDULING | Age: 60
End: 2020-09-10

## 2020-09-10 DIAGNOSIS — D86.1 SARCOIDOSIS OF LYMPH NODES: Primary | ICD-10-CM

## 2020-09-11 ENCOUNTER — HOSPITAL ENCOUNTER (OUTPATIENT)
Dept: RADIOLOGY | Age: 60
Discharge: HOME | End: 2020-09-11
Attending: INTERNAL MEDICINE
Payer: COMMERCIAL

## 2020-09-11 DIAGNOSIS — D86.1 SARCOIDOSIS OF LYMPH NODES: ICD-10-CM

## 2020-09-11 PROCEDURE — 71250 CT THORAX DX C-: CPT

## 2020-09-22 ENCOUNTER — HOSPITAL ENCOUNTER (OUTPATIENT)
Dept: PULMONOLOGY | Facility: HOSPITAL | Age: 60
Discharge: HOME | End: 2020-09-22
Attending: INTERNAL MEDICINE
Payer: COMMERCIAL

## 2020-09-22 PROCEDURE — 94726 PLETHYSMOGRAPHY LUNG VOLUMES: CPT

## 2020-09-22 PROCEDURE — 94729 DIFFUSING CAPACITY: CPT

## 2020-09-22 PROCEDURE — 63700000 HC SELF-ADMINISTRABLE DRUG: Performed by: INTERNAL MEDICINE

## 2020-09-22 PROCEDURE — 94060 EVALUATION OF WHEEZING: CPT

## 2020-09-22 RX ORDER — ALBUTEROL SULFATE 90 UG/1
4 INHALANT RESPIRATORY (INHALATION) ONCE
Status: COMPLETED | OUTPATIENT
Start: 2020-09-22 | End: 2020-09-22

## 2020-09-22 RX ADMIN — ALBUTEROL SULFATE 4 PUFF: 90 AEROSOL, METERED RESPIRATORY (INHALATION) at 09:26

## 2020-09-28 ENCOUNTER — APPOINTMENT (RX ONLY)
Dept: URBAN - METROPOLITAN AREA CLINIC 26 | Facility: CLINIC | Age: 60
Setting detail: DERMATOLOGY
End: 2020-09-28

## 2020-09-28 DIAGNOSIS — L57.0 ACTINIC KERATOSIS: ICD-10-CM

## 2020-09-28 DIAGNOSIS — D18.0 HEMANGIOMA: ICD-10-CM

## 2020-09-28 DIAGNOSIS — Z85.828 PERSONAL HISTORY OF OTHER MALIGNANT NEOPLASM OF SKIN: ICD-10-CM

## 2020-09-28 DIAGNOSIS — L81.4 OTHER MELANIN HYPERPIGMENTATION: ICD-10-CM

## 2020-09-28 DIAGNOSIS — L30.0 NUMMULAR DERMATITIS: ICD-10-CM

## 2020-09-28 DIAGNOSIS — D22 MELANOCYTIC NEVI: ICD-10-CM

## 2020-09-28 DIAGNOSIS — L82.1 OTHER SEBORRHEIC KERATOSIS: ICD-10-CM

## 2020-09-28 PROBLEM — D18.01 HEMANGIOMA OF SKIN AND SUBCUTANEOUS TISSUE: Status: ACTIVE | Noted: 2020-09-28

## 2020-09-28 PROBLEM — D23.71 OTHER BENIGN NEOPLASM OF SKIN OF RIGHT LOWER LIMB, INCLUDING HIP: Status: ACTIVE | Noted: 2020-09-28

## 2020-09-28 PROBLEM — D23.72 OTHER BENIGN NEOPLASM OF SKIN OF LEFT LOWER LIMB, INCLUDING HIP: Status: ACTIVE | Noted: 2020-09-28

## 2020-09-28 PROBLEM — D22.9 MELANOCYTIC NEVI, UNSPECIFIED: Status: ACTIVE | Noted: 2020-09-28

## 2020-09-28 PROCEDURE — ? LIQUID NITROGEN

## 2020-09-28 PROCEDURE — 17000 DESTRUCT PREMALG LESION: CPT

## 2020-09-28 PROCEDURE — ? PRESCRIPTION

## 2020-09-28 PROCEDURE — ? PHOTO-DOCUMENTATION

## 2020-09-28 PROCEDURE — 99214 OFFICE O/P EST MOD 30 MIN: CPT | Mod: 25

## 2020-09-28 PROCEDURE — ? ADDITIONAL NOTES

## 2020-09-28 PROCEDURE — ? COUNSELING

## 2020-09-28 RX ORDER — TRIAMCINOLONE ACETONIDE 1 MG/G
CREAM TOPICAL BID
Qty: 1 | Refills: 3 | Status: ERX

## 2020-09-28 ASSESSMENT — LOCATION SIMPLE DESCRIPTION DERM
LOCATION SIMPLE: RIGHT PRETIBIAL REGION
LOCATION SIMPLE: LEFT CHEEK
LOCATION SIMPLE: LEFT LIP
LOCATION SIMPLE: LEFT PRETIBIAL REGION

## 2020-09-28 ASSESSMENT — LOCATION DETAILED DESCRIPTION DERM
LOCATION DETAILED: LEFT PROXIMAL PRETIBIAL REGION
LOCATION DETAILED: LEFT INFERIOR VERMILION LIP
LOCATION DETAILED: RIGHT PROXIMAL PRETIBIAL REGION
LOCATION DETAILED: LEFT CENTRAL MALAR CHEEK

## 2020-09-28 ASSESSMENT — LOCATION ZONE DERM
LOCATION ZONE: LIP
LOCATION ZONE: FACE
LOCATION ZONE: LEG

## 2020-09-28 NOTE — PROCEDURE: LIQUID NITROGEN
Render Note In Bullet Format When Appropriate: No
Number Of Freeze-Thaw Cycles: 2 freeze-thaw cycles
Post-Care Instructions: I reviewed with the patient in detail post-care instructions. Patient is to wear sunprotection, and avoid picking at any of the treated lesions. Pt may apply Vaseline to crusted or scabbing areas.
Duration Of Freeze Thaw-Cycle (Seconds): 15
Detail Level: Detailed
Consent: The patient's consent was obtained including but not limited to risks of crusting, scabbing, blistering, scarring, darker or lighter pigmentary change, recurrence, incomplete removal and infection. Verbal consent also obtained.

## 2020-11-23 ENCOUNTER — TRANSCRIBE ORDERS (OUTPATIENT)
Dept: SCHEDULING | Age: 60
End: 2020-11-23

## 2020-11-24 ENCOUNTER — TRANSCRIBE ORDERS (OUTPATIENT)
Dept: SCHEDULING | Age: 60
End: 2020-11-24

## 2020-11-24 DIAGNOSIS — D86.9 SARCOIDOSIS, UNSPECIFIED: Primary | ICD-10-CM

## 2021-01-18 ENCOUNTER — HOSPITAL ENCOUNTER (OUTPATIENT)
Dept: PULMONOLOGY | Facility: HOSPITAL | Age: 61
Discharge: HOME | End: 2021-01-18
Attending: INTERNAL MEDICINE
Payer: COMMERCIAL

## 2021-01-18 DIAGNOSIS — D86.9 SARCOIDOSIS: ICD-10-CM

## 2021-01-18 PROCEDURE — 94729 DIFFUSING CAPACITY: CPT

## 2021-01-18 PROCEDURE — 94726 PLETHYSMOGRAPHY LUNG VOLUMES: CPT

## 2021-01-18 PROCEDURE — 94726 PLETHYSMOGRAPHY LUNG VOLUMES: CPT | Mod: 26 | Performed by: INTERNAL MEDICINE

## 2021-01-18 PROCEDURE — 63700000 HC SELF-ADMINISTRABLE DRUG: Performed by: INTERNAL MEDICINE

## 2021-01-18 PROCEDURE — 94060 EVALUATION OF WHEEZING: CPT | Mod: 26 | Performed by: INTERNAL MEDICINE

## 2021-01-18 PROCEDURE — 94729 DIFFUSING CAPACITY: CPT | Mod: 26 | Performed by: INTERNAL MEDICINE

## 2021-01-18 PROCEDURE — 94060 EVALUATION OF WHEEZING: CPT

## 2021-01-18 RX ORDER — ALBUTEROL SULFATE 90 UG/1
4 INHALANT RESPIRATORY (INHALATION) ONCE
Status: COMPLETED | OUTPATIENT
Start: 2021-01-18 | End: 2021-01-18

## 2021-01-18 RX ADMIN — ALBUTEROL SULFATE 4 PUFF: 90 AEROSOL, METERED RESPIRATORY (INHALATION) at 14:19

## 2021-01-19 ENCOUNTER — HOSPITAL ENCOUNTER (OUTPATIENT)
Dept: RADIOLOGY | Age: 61
Discharge: HOME | End: 2021-01-19
Attending: INTERNAL MEDICINE
Payer: COMMERCIAL

## 2021-01-19 DIAGNOSIS — D86.9 SARCOIDOSIS, UNSPECIFIED: ICD-10-CM

## 2021-01-19 PROCEDURE — 71250 CT THORAX DX C-: CPT

## 2021-03-01 ENCOUNTER — APPOINTMENT (RX ONLY)
Dept: URBAN - METROPOLITAN AREA CLINIC 26 | Facility: CLINIC | Age: 61
Setting detail: DERMATOLOGY
End: 2021-03-01

## 2021-03-01 DIAGNOSIS — L57.0 ACTINIC KERATOSIS: ICD-10-CM

## 2021-03-01 PROBLEM — D48.5 NEOPLASM OF UNCERTAIN BEHAVIOR OF SKIN: Status: ACTIVE | Noted: 2021-03-01

## 2021-03-01 PROCEDURE — ? BIOPSY BY SHAVE METHOD

## 2021-03-01 PROCEDURE — 17000 DESTRUCT PREMALG LESION: CPT | Mod: 59

## 2021-03-01 PROCEDURE — ? PHOTO-DOCUMENTATION

## 2021-03-01 PROCEDURE — ? LIQUID NITROGEN

## 2021-03-01 PROCEDURE — ? ADDITIONAL NOTES

## 2021-03-01 PROCEDURE — 11102 TANGNTL BX SKIN SINGLE LES: CPT

## 2021-03-01 ASSESSMENT — LOCATION ZONE DERM
LOCATION ZONE: FACE
LOCATION ZONE: HAND

## 2021-03-01 ASSESSMENT — LOCATION DETAILED DESCRIPTION DERM
LOCATION DETAILED: LEFT CENTRAL MALAR CHEEK
LOCATION DETAILED: 2ND WEB SPACE RIGHT HAND

## 2021-03-01 ASSESSMENT — LOCATION SIMPLE DESCRIPTION DERM
LOCATION SIMPLE: LEFT CHEEK
LOCATION SIMPLE: RIGHT HAND

## 2021-03-01 NOTE — PROCEDURE: ADDITIONAL NOTES
Additional Notes: Site of prior biopsy in 8/2018 which came back as an AK. Accession #: [id number] Additional Notes: Site of prior biopsy in 8/2018 which came back as an AK. Accession #: [id number]

## 2021-03-01 NOTE — PROCEDURE: LIQUID NITROGEN
Duration Of Freeze Thaw-Cycle (Seconds): 15
Render Note In Bullet Format When Appropriate: No
Detail Level: Detailed
Post-Care Instructions: I reviewed with the patient in detail post-care instructions. Patient is to wear sunprotection, and avoid picking at any of the treated lesions. Pt may apply Vaseline to crusted or scabbing areas.
Consent: The patient's consent was obtained including but not limited to risks of crusting, scabbing, blistering, scarring, darker or lighter pigmentary change, recurrence, incomplete removal and infection. Verbal consent also obtained.
Number Of Freeze-Thaw Cycles: 2 freeze-thaw cycles

## 2021-03-08 ENCOUNTER — RX ONLY (OUTPATIENT)
Age: 61
Setting detail: RX ONLY
End: 2021-03-08

## 2021-03-08 RX ORDER — FLUOROURACIL 5 MG/G
CREAM TOPICAL
Qty: 1 | Refills: 0 | Status: ERX | COMMUNITY
Start: 2021-03-08

## 2021-04-27 ENCOUNTER — APPOINTMENT (RX ONLY)
Dept: URBAN - METROPOLITAN AREA CLINIC 26 | Facility: CLINIC | Age: 61
Setting detail: DERMATOLOGY
End: 2021-04-27

## 2021-04-27 DIAGNOSIS — L59.0 ERYTHEMA AB IGNE [DERMATITIS AB IGNE]: ICD-10-CM

## 2021-04-27 DIAGNOSIS — L82.1 OTHER SEBORRHEIC KERATOSIS: ICD-10-CM

## 2021-04-27 DIAGNOSIS — L57.0 ACTINIC KERATOSIS: ICD-10-CM | Status: RESOLVED

## 2021-04-27 DIAGNOSIS — L85.3 XEROSIS CUTIS: ICD-10-CM | Status: INADEQUATELY CONTROLLED

## 2021-04-27 DIAGNOSIS — L81.4 OTHER MELANIN HYPERPIGMENTATION: ICD-10-CM

## 2021-04-27 DIAGNOSIS — Z85.828 PERSONAL HISTORY OF OTHER MALIGNANT NEOPLASM OF SKIN: ICD-10-CM

## 2021-04-27 DIAGNOSIS — D22 MELANOCYTIC NEVI: ICD-10-CM

## 2021-04-27 DIAGNOSIS — D18.0 HEMANGIOMA: ICD-10-CM

## 2021-04-27 DIAGNOSIS — I99.8 OTHER DISORDER OF CIRCULATORY SYSTEM: ICD-10-CM

## 2021-04-27 PROBLEM — D18.01 HEMANGIOMA OF SKIN AND SUBCUTANEOUS TISSUE: Status: ACTIVE | Noted: 2021-04-27

## 2021-04-27 PROBLEM — D22.71 MELANOCYTIC NEVI OF RIGHT LOWER LIMB, INCLUDING HIP: Status: ACTIVE | Noted: 2021-04-27

## 2021-04-27 PROBLEM — D22.9 MELANOCYTIC NEVI, UNSPECIFIED: Status: ACTIVE | Noted: 2021-04-27

## 2021-04-27 PROBLEM — L30.9 DERMATITIS, UNSPECIFIED: Status: ACTIVE | Noted: 2021-04-27

## 2021-04-27 PROBLEM — D23.71 OTHER BENIGN NEOPLASM OF SKIN OF RIGHT LOWER LIMB, INCLUDING HIP: Status: ACTIVE | Noted: 2021-04-27

## 2021-04-27 PROBLEM — D23.72 OTHER BENIGN NEOPLASM OF SKIN OF LEFT LOWER LIMB, INCLUDING HIP: Status: ACTIVE | Noted: 2021-04-27

## 2021-04-27 PROCEDURE — 17000 DESTRUCT PREMALG LESION: CPT

## 2021-04-27 PROCEDURE — ? SUNSCREEN RECOMMENDATIONS

## 2021-04-27 PROCEDURE — ? ADDITIONAL NOTES

## 2021-04-27 PROCEDURE — ? FULL BODY SKIN EXAM

## 2021-04-27 PROCEDURE — 99214 OFFICE O/P EST MOD 30 MIN: CPT | Mod: 25

## 2021-04-27 PROCEDURE — ? LIQUID NITROGEN

## 2021-04-27 PROCEDURE — 17003 DESTRUCT PREMALG LES 2-14: CPT

## 2021-04-27 PROCEDURE — ? PRESCRIPTION

## 2021-04-27 PROCEDURE — ? PHOTO-DOCUMENTATION

## 2021-04-27 PROCEDURE — ? TREATMENT REGIMEN

## 2021-04-27 PROCEDURE — ? COUNSELING

## 2021-04-27 RX ORDER — TACROLIMUS 1 MG/G
OINTMENT TOPICAL BID
Qty: 1 | Refills: 3 | Status: ERX | COMMUNITY
Start: 2021-04-27

## 2021-04-27 RX ADMIN — TACROLIMUS 1: 1 OINTMENT TOPICAL at 00:00

## 2021-04-27 ASSESSMENT — LOCATION ZONE DERM
LOCATION ZONE: LEG
LOCATION ZONE: NOSE
LOCATION ZONE: HAND
LOCATION ZONE: TRUNK
LOCATION ZONE: FACE
LOCATION ZONE: FEET

## 2021-04-27 ASSESSMENT — LOCATION SIMPLE DESCRIPTION DERM
LOCATION SIMPLE: LEFT CHEEK
LOCATION SIMPLE: RIGHT THIGH
LOCATION SIMPLE: RIGHT PLANTAR SURFACE
LOCATION SIMPLE: LEFT HAND
LOCATION SIMPLE: CHEST
LOCATION SIMPLE: LEFT FOOT
LOCATION SIMPLE: RIGHT PRETIBIAL REGION
LOCATION SIMPLE: NOSE
LOCATION SIMPLE: LEFT PRETIBIAL REGION
LOCATION SIMPLE: RIGHT FOREHEAD

## 2021-04-27 ASSESSMENT — LOCATION DETAILED DESCRIPTION DERM
LOCATION DETAILED: LEFT LATERAL ACHILLES SKIN
LOCATION DETAILED: RIGHT ANTERIOR PROXIMAL THIGH
LOCATION DETAILED: RIGHT LATERAL SUPERIOR CHEST
LOCATION DETAILED: LEFT RADIAL DORSAL HAND
LOCATION DETAILED: LEFT NASAL DORSUM
LOCATION DETAILED: RIGHT INFERIOR FOREHEAD
LOCATION DETAILED: LEFT MEDIAL DISTAL PRETIBIAL REGION
LOCATION DETAILED: RIGHT PROXIMAL PRETIBIAL REGION
LOCATION DETAILED: LEFT CENTRAL MALAR CHEEK
LOCATION DETAILED: UPPER STERNUM
LOCATION DETAILED: RIGHT PLANTAR FOREFOOT OVERLYING 3RD METATARSAL

## 2021-04-27 NOTE — PROCEDURE: LIQUID NITROGEN
Render Post-Care Instructions In Note?: no
Duration Of Freeze Thaw-Cycle (Seconds): 15
Detail Level: Detailed
Consent: The patient's consent was obtained including but not limited to risks of crusting, scabbing, blistering, scarring, darker or lighter pigmentary change, recurrence, incomplete removal and infection. Verbal consent also obtained.
Post-Care Instructions: I reviewed with the patient in detail post-care instructions. Patient is to wear sunprotection, and avoid picking at any of the treated lesions. Pt may apply Vaseline to crusted or scabbing areas.
Number Of Freeze-Thaw Cycles: 2 freeze-thaw cycles

## 2021-04-27 NOTE — PROCEDURE: ADDITIONAL NOTES
Additional Notes: Patient consent was obtained to proceed with the visit and recommended plan of care after discussion of all risks and benefits, including the risks of COVID-19 exposure.
Detail Level: Simple
Additional Notes: Resolved with Efudex treatment from 3/01/21 biopsy
Render Risk Assessment In Note?: no
Additional Notes: Has triamcinolone, notes has used for two weeks solid and still has rash. Needs maintenance medication
Additional Notes: From sitting in shin x one hour prior to appointment

## 2021-04-27 NOTE — PROCEDURE: TREATMENT REGIMEN
Otc Regimen: Neutrogena Face and Body SPF 70 stick
Detail Level: Simple
Initiate Treatment: tacrolimus 0.1 % topical ointment BID\\nSig: Apply to AA of eczema BID as needed for maintenance.
Continue Regimen: Triamcinolone acetonide 0.1% topical cream BID x 2 weeks for flares

## 2021-05-26 ENCOUNTER — TRANSCRIBE ORDERS (OUTPATIENT)
Dept: SCHEDULING | Age: 61
End: 2021-05-26

## 2021-06-03 ENCOUNTER — TRANSCRIBE ORDERS (OUTPATIENT)
Dept: SCHEDULING | Age: 61
End: 2021-06-03

## 2021-06-03 DIAGNOSIS — D86.9 SARCOIDOSIS, UNSPECIFIED: Primary | ICD-10-CM

## 2021-06-25 ENCOUNTER — OFFICE VISIT (OUTPATIENT)
Dept: CARDIOLOGY | Facility: CLINIC | Age: 61
End: 2021-06-25
Payer: COMMERCIAL

## 2021-06-25 VITALS
DIASTOLIC BLOOD PRESSURE: 74 MMHG | HEIGHT: 62 IN | WEIGHT: 134.2 LBS | OXYGEN SATURATION: 98 % | HEART RATE: 67 BPM | BODY MASS INDEX: 24.69 KG/M2 | SYSTOLIC BLOOD PRESSURE: 124 MMHG

## 2021-06-25 DIAGNOSIS — D86.9 SARCOIDOSIS: ICD-10-CM

## 2021-06-25 DIAGNOSIS — E78.2 MIXED HYPERLIPIDEMIA: Primary | ICD-10-CM

## 2021-06-25 PROCEDURE — 99214 OFFICE O/P EST MOD 30 MIN: CPT | Performed by: INTERNAL MEDICINE

## 2021-06-25 PROCEDURE — 93000 ELECTROCARDIOGRAM COMPLETE: CPT | Performed by: INTERNAL MEDICINE

## 2021-06-25 PROCEDURE — 3008F BODY MASS INDEX DOCD: CPT | Performed by: INTERNAL MEDICINE

## 2021-06-25 NOTE — ASSESSMENT & PLAN NOTE
Pulmonary sarcoidosis diagnosed with bronchoscopy.  No symptoms to suggest cardiac involvement.  Echocardiogram 7/16/2020 personally reviewed and shows no regional wall motion abnormalities to suggest myocardial involvement.  --Reviewed concerning cardiac symptoms, call with any.

## 2021-06-25 NOTE — ASSESSMENT & PLAN NOTE
Patient reports hyperlipidemia, which prompted coronary calcium score.   From 2/19/20: total cholesterol 255, triglycerides 98, HDL 69, .  Elevated LDL.  Coronary calcium score 2/27/2020: Composite 0.  Estimated 1-year risk of ASCVD 2.2%. Statin therapy not currently indicated.  --Recommend repeat fasting lipids, lab prescription given.  --Lifestyle modification discussed at length, dietary changes stressed.

## 2021-06-25 NOTE — LETTER
June 25, 2021     Susy Leong MD  [address] [facility] [address]    Patient: Monica Loja  YOB: 1960  Date of Visit: 6/25/2021      Dear Dr. Leong:    Thank you for referring Monica Loja to me for evaluation. Below are my notes for this consultation.    If you have questions, please do not hesitate to call me. I look forward to following your patient along with you.         Sincerely,        Daphney Ho MD        CC: No Recipients  Daphney Ho MD  6/25/2021  9:56 AM  Sign when Signing Visit   Daphney Ho MD, [facility]  Cardiology    [facility] Level  [address] [id number]    TEL  [phone]  [facility]/ml     June 25, 2021    Reason for visit: Cardiovascular follow-up.    Monica Loja is a 61 y.o. female who presents for cardiovascular follow-up. Monica has a history of hyperlipidemia, premature menopause, and pulmonary sarcoidosis.  I met her initial cardiovascular consultation on June 26, 2020 at which time she was without cardiovascular symptom or concern.  I recommended an echocardiogram given new diagnosis of pulmonary sarcoidosis with an abnormal electrocardiogram.  This was completed on July 16, 2020 as outlined below.    Monica reports feeling well from a cardiovascular standpoint.  She is without chest pain, shortness of breath, orthopnea, PND, lower extremity edema, palpitations, dizziness, lightheadedness, or syncope.     She self discontinued prednisone and methotrexate which was prescribed for sarcoidosis.  She tells me she has never had any respiratory symptoms and had severe side effects from these medications including weight gain, hair loss, headaches and overall fatigue.  Since stopping the medication she feels the best she has felt in a long time.  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    Past Medical History:  1. Pulmonary sarcoidosis  2. Hyperlipidemia  3. Osteoporosis  4. Premature menopause  5. Obstetric history:   labor    Past Surgical History:  1. Tonsillectomy  2. Pulmonary biopsy    Medications: denosumab SQ as directed, vitamin D, fish oil.    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     Family History: Mother has hyperlipidemia and diabetes.     Exam:  Objective   Vitals:    21 0909   BP: 124/74   Pulse: 67   SpO2: 98%     Body mass index is 24.55 kg/m².  Physical Exam  Constitutional:       General: She is not in acute distress.     Appearance: She is well-developed.   HENT:      Head: Normocephalic.      Mouth/Throat:      Mouth: Mucous membranes are not cyanotic.   Eyes:      Conjunctiva/sclera: Conjunctivae normal.   Neck:      Vascular: No carotid bruit or JVD.   Cardiovascular:      Rate and Rhythm: Normal rate and regular rhythm.  No extrasystoles are present.     Pulses:           Carotid pulses are 2+ on the right side and 2+ on the left side.       Radial pulses are 2+ on the right side and 2+ on the left side.      Heart sounds: S1 normal and S2 normal. No murmur heard.   No gallop.    Pulmonary:      Effort: Pulmonary effort is normal. No respiratory distress.      Breath sounds: No wheezing or rales.   Abdominal:      General: Bowel sounds are normal.      Palpations: Abdomen is soft.      Tenderness: There is no abdominal tenderness.   Lymphadenopathy:      Cervical: No cervical adenopathy.   Skin:     General: Skin is warm and dry.   Neurological:      Mental Status: She is alert.   Psychiatric:         Speech: Speech normal.         Labs:  No results found for: WBC, HGB, PLT, CHOL, TRIG, HDL, LDL, LDLDIRECT, ALT, AST, NA, K, CREATININE, TSH, INR, HGBA1C, MICROALBUR   [facility] 2/15/21:  creatinine 0.94.  From 2/19/20: total cholesterol 255, triglycerides 98, HDL 69, , TSH 4.96, AST 19, ALT 17, sodium 140, potassium 4.2, BUN 12, creatinine 0.98.  Personally reviewed, my interpretation: elevated LDL.     Cardiovascular Studies:   1.  Echocardiogram 7/16/2020 (personally reviewed): Normal left ventricular cavity size with low normal systolic function, EF 50 to 55%.  No regional wall motion abnormalities.  Normal right ventricular size with preserved systolic function.  Normal left and right atrial size.  Trileaflet aortic valve with mild sclerosis.  Trace mitral regurgitation.  Trace pulmonary regurgitation.  Mild tricuspid regurgitation.  Estimated RVSP 19 mmHg.  No pericardial effusion.  2.  Coronary calcium score 2/27/2020: Coronary calcium score of 0.    ECG from today personally reviewed and discussed with the patient shows sinus rhythm with isolated premature atrial beats.     Assessment/Plan   Problem List Items Addressed This Visit        Endocrine/Metabolic    Mixed hyperlipidemia - Primary     Patient reports hyperlipidemia, which prompted coronary calcium score.   From 2/19/20: total cholesterol 255, triglycerides 98, HDL 69, .  Elevated LDL.  Coronary calcium score 2/27/2020: Composite 0.  Estimated 1-year risk of ASCVD 2.2%. Statin therapy not currently indicated.  --Recommend repeat fasting lipids, lab prescription given.  --Lifestyle modification discussed at length, dietary changes stressed.         Relevant Orders    ECG 12 lead (Completed)    Lipid panel       Infectious/Inflammatory    Sarcoidosis     Pulmonary sarcoidosis diagnosed with bronchoscopy.  No symptoms to suggest cardiac involvement.  Echocardiogram 7/16/2020 personally reviewed and shows no regional wall motion abnormalities to suggest myocardial involvement.  --Reviewed concerning cardiac symptoms, call with any.                  This letter was generated using speech recognition software. Please excuse  any typographical errors.    Return in about 1 year (around 6/25/2022).        Daphney Ho MD, FACC

## 2021-06-25 NOTE — PROGRESS NOTES
Daphney Ho MD, [facility]  Cardiology    [facility]    [facility]  The [facility] [address] Level  [address] [place] [address]    TEL  [phone]  [facility]     2021    Reason for visit: Cardiovascular follow-up.    Monica Loja is a 61 y.o. female who presents for cardiovascular follow-up. Monica has a history of hyperlipidemia, premature menopause, and pulmonary sarcoidosis.  I met her initial cardiovascular consultation on 2020 at which time she was without cardiovascular symptom or concern.  I recommended an echocardiogram given new diagnosis of pulmonary sarcoidosis with an abnormal electrocardiogram.  This was completed on 2020 as outlined below.    Monica reports feeling well from a cardiovascular standpoint.  She is without chest pain, shortness of breath, orthopnea, PND, lower extremity edema, palpitations, dizziness, lightheadedness, or syncope.     She self discontinued prednisone and methotrexate which was prescribed for sarcoidosis.  She tells me she has never had any respiratory symptoms and had severe side effects from these medications including weight gain, hair loss, headaches and overall fatigue.  Since stopping the medication she feels the best she has felt in a long time.  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    Past Medical History:  1. Pulmonary sarcoidosis  2. Hyperlipidemia  3. Osteoporosis  4. Premature menopause  5. Obstetric history:   labor    Past Surgical History:  1. Tonsillectomy  2. Pulmonary biopsy    Medications: denosumab SQ as directed, vitamin D, fish oil.    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     Family History: Mother has hyperlipidemia and  diabetes.     Exam:  Objective   Vitals:    06/25/21 0909   BP: 124/74   Pulse: 67   SpO2: 98%     Body mass index is 24.55 kg/m².  Physical Exam  Constitutional:       General: She is not in acute distress.     Appearance: She is well-developed.   HENT:      Head: Normocephalic.      Mouth/Throat:      Mouth: Mucous membranes are not cyanotic.   Eyes:      Conjunctiva/sclera: Conjunctivae normal.   Neck:      Vascular: No carotid bruit or JVD.   Cardiovascular:      Rate and Rhythm: Normal rate and regular rhythm.  No extrasystoles are present.     Pulses:           Carotid pulses are 2+ on the right side and 2+ on the left side.       Radial pulses are 2+ on the right side and 2+ on the left side.      Heart sounds: S1 normal and S2 normal. No murmur heard.   No gallop.    Pulmonary:      Effort: Pulmonary effort is normal. No respiratory distress.      Breath sounds: No wheezing or rales.   Abdominal:      General: Bowel sounds are normal.      Palpations: Abdomen is soft.      Tenderness: There is no abdominal tenderness.   Lymphadenopathy:      Cervical: No cervical adenopathy.   Skin:     General: Skin is warm and dry.   Neurological:      Mental Status: She is alert.   Psychiatric:         Speech: Speech normal.         Labs:  No results found for: WBC, HGB, PLT, CHOL, TRIG, HDL, LDL, LDLDIRECT, ALT, AST, NA, K, CREATININE, TSH, INR, HGBA1C, MICROALBUR   Fom 2/15/21: creatinine 0.94.  From 2/19/20: total cholesterol 255, triglycerides 98, HDL 69, , TSH 4.96, AST 19, ALT 17, sodium 140, potassium 4.2, BUN 12, creatinine 0.98.  Personally reviewed, my interpretation: elevated LDL.     Cardiovascular Studies:   1.  Echocardiogram 7/16/2020 (personally reviewed): Normal left ventricular cavity size with low normal systolic function, EF 50 to 55%.  No regional wall motion abnormalities.  Normal right ventricular size with preserved systolic function.  Normal left and right atrial size.  Trileaflet aortic  valve with mild sclerosis.  Trace mitral regurgitation.  Trace pulmonary regurgitation.  Mild tricuspid regurgitation.  Estimated RVSP 19 mmHg.  No pericardial effusion.  2.  Coronary calcium score 2/27/2020: Coronary calcium score of 0.    ECG from today personally reviewed and discussed with the patient shows sinus rhythm with isolated premature atrial beats.     Assessment/Plan   Problem List Items Addressed This Visit        Endocrine/Metabolic    Mixed hyperlipidemia - Primary     Patient reports hyperlipidemia, which prompted coronary calcium score.   From 2/19/20: total cholesterol 255, triglycerides 98, HDL 69, .  Elevated LDL.  Coronary calcium score 2/27/2020: Composite 0.  Estimated 1-year risk of ASCVD 2.2%. Statin therapy not currently indicated.  --Recommend repeat fasting lipids, lab prescription given.  --Lifestyle modification discussed at length, dietary changes stressed.         Relevant Orders    ECG 12 lead (Completed)    Lipid panel       Infectious/Inflammatory    Sarcoidosis     Pulmonary sarcoidosis diagnosed with bronchoscopy.  No symptoms to suggest cardiac involvement.  Echocardiogram 7/16/2020 personally reviewed and shows no regional wall motion abnormalities to suggest myocardial involvement.  --Reviewed concerning cardiac symptoms, call with any.                  This letter was generated using speech recognition software. Please excuse any typographical errors.    Return in about 1 year (around 6/25/2022).        [facility] Ho MD, [facility]

## 2021-07-09 LAB
CHOLEST SERPL-MCNC: 237 MG/DL (ref 100–199)
HDLC SERPL-MCNC: 67 MG/DL
LDLC SERPL CALC-MCNC: 158 MG/DL (ref 0–99)
SPECIMEN STATUS: NORMAL
TRIGL SERPL-MCNC: 70 MG/DL (ref 0–149)
VLDLC SERPL CALC-MCNC: 12 MG/DL (ref 5–40)

## 2021-07-15 NOTE — RESULT ENCOUNTER NOTE
Spoke with patient.  Reviewed lipid panel.  Informed continue with heart healthy lifestyle.  States she eats well,exercises.  States Prolia medication can elevate cholesterol

## 2021-08-10 ENCOUNTER — HOSPITAL ENCOUNTER (OUTPATIENT)
Dept: PULMONOLOGY | Facility: HOSPITAL | Age: 61
Discharge: HOME | End: 2021-08-10
Attending: INTERNAL MEDICINE
Payer: COMMERCIAL

## 2021-08-10 PROCEDURE — 94729 DIFFUSING CAPACITY: CPT

## 2021-08-10 PROCEDURE — 63700000 HC SELF-ADMINISTRABLE DRUG: Performed by: INTERNAL MEDICINE

## 2021-08-10 PROCEDURE — 94726 PLETHYSMOGRAPHY LUNG VOLUMES: CPT

## 2021-08-10 PROCEDURE — 94060 EVALUATION OF WHEEZING: CPT

## 2021-08-10 RX ORDER — ALBUTEROL SULFATE 90 UG/1
4 INHALANT RESPIRATORY (INHALATION) ONCE
Status: COMPLETED | OUTPATIENT
Start: 2021-08-10 | End: 2021-08-10

## 2021-08-10 RX ADMIN — ALBUTEROL SULFATE 4 PUFF: 90 AEROSOL, METERED RESPIRATORY (INHALATION) at 15:44

## 2021-09-14 ENCOUNTER — TELEPHONE (OUTPATIENT)
Dept: CARDIOLOGY | Facility: CLINIC | Age: 61
End: 2021-09-14

## 2021-12-06 ENCOUNTER — APPOINTMENT (RX ONLY)
Dept: URBAN - METROPOLITAN AREA CLINIC 26 | Facility: CLINIC | Age: 61
Setting detail: DERMATOLOGY
End: 2021-12-06

## 2021-12-06 DIAGNOSIS — Z85.828 PERSONAL HISTORY OF OTHER MALIGNANT NEOPLASM OF SKIN: ICD-10-CM

## 2021-12-06 DIAGNOSIS — D22 MELANOCYTIC NEVI: ICD-10-CM

## 2021-12-06 DIAGNOSIS — L82.1 OTHER SEBORRHEIC KERATOSIS: ICD-10-CM

## 2021-12-06 DIAGNOSIS — L81.4 OTHER MELANIN HYPERPIGMENTATION: ICD-10-CM

## 2021-12-06 DIAGNOSIS — D18.0 HEMANGIOMA: ICD-10-CM

## 2021-12-06 PROBLEM — D23.72 OTHER BENIGN NEOPLASM OF SKIN OF LEFT LOWER LIMB, INCLUDING HIP: Status: ACTIVE | Noted: 2021-12-06

## 2021-12-06 PROBLEM — D37.01 NEOPLASM OF UNCERTAIN BEHAVIOR OF LIP: Status: ACTIVE | Noted: 2021-12-06

## 2021-12-06 PROBLEM — D23.71 OTHER BENIGN NEOPLASM OF SKIN OF RIGHT LOWER LIMB, INCLUDING HIP: Status: ACTIVE | Noted: 2021-12-06

## 2021-12-06 PROBLEM — D22.5 MELANOCYTIC NEVI OF TRUNK: Status: ACTIVE | Noted: 2021-12-06

## 2021-12-06 PROBLEM — D22.71 MELANOCYTIC NEVI OF RIGHT LOWER LIMB, INCLUDING HIP: Status: ACTIVE | Noted: 2021-12-06

## 2021-12-06 PROBLEM — D18.01 HEMANGIOMA OF SKIN AND SUBCUTANEOUS TISSUE: Status: ACTIVE | Noted: 2021-12-06

## 2021-12-06 PROCEDURE — ? FULL BODY SKIN EXAM

## 2021-12-06 PROCEDURE — ? SUNSCREEN RECOMMENDATIONS

## 2021-12-06 PROCEDURE — 99213 OFFICE O/P EST LOW 20 MIN: CPT | Mod: 25

## 2021-12-06 PROCEDURE — ? COUNSELING

## 2021-12-06 PROCEDURE — ? ADDITIONAL NOTES

## 2021-12-06 PROCEDURE — ? BIOPSY BY SHAVE METHOD

## 2021-12-06 PROCEDURE — 40490 BIOPSY OF LIP: CPT

## 2021-12-06 ASSESSMENT — LOCATION SIMPLE DESCRIPTION DERM
LOCATION SIMPLE: RIGHT PLANTAR SURFACE
LOCATION SIMPLE: UPPER BACK
LOCATION SIMPLE: LEFT LIP

## 2021-12-06 ASSESSMENT — LOCATION DETAILED DESCRIPTION DERM
LOCATION DETAILED: LEFT INFERIOR VERMILION LIP
LOCATION DETAILED: SUPERIOR THORACIC SPINE
LOCATION DETAILED: RIGHT PLANTAR FOREFOOT OVERLYING 3RD METATARSAL

## 2021-12-06 ASSESSMENT — LOCATION ZONE DERM
LOCATION ZONE: FEET
LOCATION ZONE: TRUNK
LOCATION ZONE: LIP

## 2021-12-06 NOTE — PROCEDURE: BIOPSY BY SHAVE METHOD
Detail Level: Detailed
Depth Of Biopsy: dermis
Was A Bandage Applied: Yes
Size Of Lesion In Cm: 0
Biopsy Type: H and E
Biopsy Method: Personna blade
Anesthesia Type: 1% lidocaine with epinephrine
Anesthesia Volume In Cc (Will Not Render If 0): 0.5
Hemostasis: Electrocautery
Wound Care: Petrolatum
Dressing: bandage
Destruction After The Procedure: No
Type Of Destruction Used: Curettage
Curettage Text: The wound bed was treated with curettage after the biopsy was performed.
Cryotherapy Text: The wound bed was treated with cryotherapy after the biopsy was performed.
Electrodesiccation Text: The wound bed was treated with electrodesiccation after the biopsy was performed.
Electrodesiccation And Curettage Text: The wound bed was treated with electrodesiccation and curettage after the biopsy was performed.
Silver Nitrate Text: The wound bed was treated with silver nitrate after the biopsy was performed.
Lab: 6
Lab Facility: [id number]
Path Notes (To The Dermatopathologist): .
Consent: Verbal consent was obtained and risks were reviewed including but not limited to scarring, infection, bleeding, scabbing, incomplete removal, nerve damage and allergy to anesthesia.
Post-Care Instructions: I reviewed with the patient in detail post-care instructions. Patient is to keep the biopsy site dry overnight, and then apply Vaseline/Aquaphor twice daily until healed. Patient may apply hydrogen peroxide soaks to remove any crusting.
Notification Instructions: Patient will be notified of biopsy results. However, patient instructed to call the office if not contacted within 2 weeks.
Billing Type: Third-Party Bill
Information: Selecting Yes will display possible errors in your note based on the variables you have selected. This validation is only offered as a suggestion for you. PLEASE NOTE THAT THE VALIDATION TEXT WILL BE REMOVED WHEN YOU FINALIZE YOUR NOTE. IF YOU WANT TO FAX A PRELIMINARY NOTE YOU WILL NEED TO TOGGLE THIS TO 'NO' IF YOU DO NOT WANT IT IN YOUR FAXED NOTE.

## 2022-05-23 ENCOUNTER — APPOINTMENT (RX ONLY)
Dept: URBAN - METROPOLITAN AREA CLINIC 26 | Facility: CLINIC | Age: 62
Setting detail: DERMATOLOGY
End: 2022-05-23

## 2022-05-23 DIAGNOSIS — L57.0 ACTINIC KERATOSIS: ICD-10-CM

## 2022-05-23 DIAGNOSIS — L82.0 INFLAMED SEBORRHEIC KERATOSIS: ICD-10-CM

## 2022-05-23 DIAGNOSIS — D22 MELANOCYTIC NEVI: ICD-10-CM

## 2022-05-23 DIAGNOSIS — L81.4 OTHER MELANIN HYPERPIGMENTATION: ICD-10-CM

## 2022-05-23 DIAGNOSIS — D18.0 HEMANGIOMA: ICD-10-CM

## 2022-05-23 DIAGNOSIS — L82.1 OTHER SEBORRHEIC KERATOSIS: ICD-10-CM

## 2022-05-23 PROBLEM — D22.71 MELANOCYTIC NEVI OF RIGHT LOWER LIMB, INCLUDING HIP: Status: ACTIVE | Noted: 2022-05-23

## 2022-05-23 PROBLEM — D18.01 HEMANGIOMA OF SKIN AND SUBCUTANEOUS TISSUE: Status: ACTIVE | Noted: 2022-05-23

## 2022-05-23 PROBLEM — D23.72 OTHER BENIGN NEOPLASM OF SKIN OF LEFT LOWER LIMB, INCLUDING HIP: Status: ACTIVE | Noted: 2022-05-23

## 2022-05-23 PROBLEM — D23.71 OTHER BENIGN NEOPLASM OF SKIN OF RIGHT LOWER LIMB, INCLUDING HIP: Status: ACTIVE | Noted: 2022-05-23

## 2022-05-23 PROBLEM — D22.5 MELANOCYTIC NEVI OF TRUNK: Status: ACTIVE | Noted: 2022-05-23

## 2022-05-23 PROCEDURE — ? COUNSELING

## 2022-05-23 PROCEDURE — 17000 DESTRUCT PREMALG LESION: CPT | Mod: 59

## 2022-05-23 PROCEDURE — 99213 OFFICE O/P EST LOW 20 MIN: CPT | Mod: 25

## 2022-05-23 PROCEDURE — ? PRESCRIPTION MEDICATION MANAGEMENT

## 2022-05-23 PROCEDURE — ? SUNSCREEN RECOMMENDATIONS

## 2022-05-23 PROCEDURE — ? LIQUID NITROGEN

## 2022-05-23 PROCEDURE — ? PHOTO-DOCUMENTATION

## 2022-05-23 PROCEDURE — ? FULL BODY SKIN EXAM

## 2022-05-23 PROCEDURE — 17110 DESTRUCTION B9 LES UP TO 14: CPT

## 2022-05-23 ASSESSMENT — LOCATION DETAILED DESCRIPTION DERM
LOCATION DETAILED: RIGHT PLANTAR FOREFOOT OVERLYING 3RD METATARSAL
LOCATION DETAILED: SUPERIOR THORACIC SPINE
LOCATION DETAILED: RIGHT PROXIMAL PRETIBIAL REGION
LOCATION DETAILED: LEFT SUPERIOR CENTRAL MALAR CHEEK
LOCATION DETAILED: LEFT DISTAL PRETIBIAL REGION

## 2022-05-23 ASSESSMENT — LOCATION ZONE DERM
LOCATION ZONE: FACE
LOCATION ZONE: LEG
LOCATION ZONE: TRUNK
LOCATION ZONE: FEET

## 2022-05-23 ASSESSMENT — LOCATION SIMPLE DESCRIPTION DERM
LOCATION SIMPLE: RIGHT PLANTAR SURFACE
LOCATION SIMPLE: RIGHT PRETIBIAL REGION
LOCATION SIMPLE: UPPER BACK
LOCATION SIMPLE: LEFT PRETIBIAL REGION
LOCATION SIMPLE: LEFT CHEEK

## 2022-05-23 NOTE — PROCEDURE: LIQUID NITROGEN
Consent: The patient's verbal consent was obtained including but not limited to risks of crusting, scabbing, blistering, scarring, darker or lighter pigmentary change, recurrence, incomplete removal and infection. Verbal consent also obtained.
Render Post-Care Instructions In Note?: yes
Duration Of Freeze Thaw-Cycle (Seconds): 15
Post-Care Instructions: I reviewed with the patient in detail post-care instructions. Patient is to wear sunprotection, and avoid picking at any of the treated lesions. Pt may apply Vaseline to crusted or scabbing areas.
Number Of Freeze-Thaw Cycles: 2 freeze-thaw cycles
Detail Level: Detailed
Render Note In Bullet Format When Appropriate: No
Application Tool (Optional): Liquid Nitrogen Sprayer
Medical Necessity Clause: This procedure was medically necessary because the lesions that were treated were:
Consent: The patient's verbal consent was obtained including but not limited to risks of crusting, scabbing, blistering, scarring, darker or lighter pigmentary change, recurrence, incomplete removal and infection.
Spray Paint Text: The liquid nitrogen was applied to the skin utilizing a spray paint frosting technique.
Duration Of Freeze Thaw-Cycle (Seconds): 12
Medical Necessity Information: It is in your best interest to select a reason for this procedure from the list below. All of these items fulfill various CMS LCD requirements except the new and changing color options.
Number Of Freeze-Thaw Cycles: 3 freeze-thaw cycles

## 2022-05-23 NOTE — PROCEDURE: PRESCRIPTION MEDICATION MANAGEMENT
Render In Strict Bullet Format?: No
Initiate Treatment: Efudex 5% cream twice daily for 2 to 4 weeks as directed
Detail Level: Zone

## 2022-06-10 ENCOUNTER — TRANSCRIBE ORDERS (OUTPATIENT)
Dept: SCHEDULING | Age: 62
End: 2022-06-10

## 2022-06-10 DIAGNOSIS — M81.0 AGE-RELATED OSTEOPOROSIS WITHOUT CURRENT PATHOLOGICAL FRACTURE: Primary | ICD-10-CM

## 2022-06-30 ENCOUNTER — OFFICE VISIT (OUTPATIENT)
Dept: CARDIOLOGY | Facility: CLINIC | Age: 62
End: 2022-06-30
Payer: COMMERCIAL

## 2022-06-30 VITALS
HEART RATE: 79 BPM | RESPIRATION RATE: 16 BRPM | SYSTOLIC BLOOD PRESSURE: 120 MMHG | WEIGHT: 130.4 LBS | HEIGHT: 64 IN | BODY MASS INDEX: 22.26 KG/M2 | OXYGEN SATURATION: 99 % | DIASTOLIC BLOOD PRESSURE: 76 MMHG

## 2022-06-30 DIAGNOSIS — D86.9 SARCOIDOSIS, UNSPECIFIED: ICD-10-CM

## 2022-06-30 DIAGNOSIS — E78.2 MIXED HYPERLIPIDEMIA: Primary | ICD-10-CM

## 2022-06-30 PROCEDURE — 3008F BODY MASS INDEX DOCD: CPT | Performed by: INTERNAL MEDICINE

## 2022-06-30 PROCEDURE — 99214 OFFICE O/P EST MOD 30 MIN: CPT | Performed by: INTERNAL MEDICINE

## 2022-06-30 PROCEDURE — 93000 ELECTROCARDIOGRAM COMPLETE: CPT | Performed by: INTERNAL MEDICINE

## 2022-06-30 NOTE — PROGRESS NOTES
Daphney Ho MD, [facility]  Cardiology    [facility]    [facility]  The [facility] [address]  [address] [place] [address]    TEL  [phone]  [facility]     2022    Reason for visit: Cardiovascular follow-up.    Monica Loja is a 62 y.o. female who presents for cardiovascular follow-up. Monica has a history of hyperlipidemia, premature menopause, and pulmonary sarcoidosis.  I last saw her in the office on 2021 at which time she reported feeling well from a cardiovascular standpoint.  She had self discontinued prednisone and methotrexate which was prescribed for pulmonary sarcoidosis as she had no symptoms.  I encouraged her to discuss this further with her pulmonologist.    Interval history obtained from the patient and review of all available medical records.  She underwent updated fasting lipids on 2021 which returned elevated.    Today, Monica reports feeling well from a cardiovascular standpoint.  The last several months have been quite busy as her mother was sick that her mother-in-law was ill and subsequently passed away.  She just moved and has been quite busy.she has not been making great dietary choices as a result.  We discussed repeating her fasting lipids which she will do in the next few months.  She is without chest pain, shortness of breath, orthopnea, PND, lower extremity edema, palpitations, dizziness, lightheadedness, or syncope.   She remains off therapy for pulmonary sarcoidosis.  She states she will have a CT scan of the lungs completed in the near future.    Past Medical History:  1. Pulmonary sarcoidosis  2. Hyperlipidemia  3. Osteoporosis  4. Premature menopause  5. Obstetric history:   labor    Past Surgical History:  1. Tonsillectomy  2. Pulmonary biopsy    Medications: denosumab SQ as directed, vitamin D, fish oil.    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     Family History: Mother has hyperlipidemia and diabetes.     Exam:  Objective   Vitals:    06/30/22 1135   BP: 120/76   Pulse: 79   Resp: 16   SpO2: 99%     Body mass index is 22.38 kg/m².  Physical Exam  Constitutional:       General: She is not in acute distress.     Appearance: She is well-developed.   HENT:      Head: Normocephalic.      Mouth/Throat:      Mouth: Mucous membranes are not cyanotic.   Eyes:      Conjunctiva/sclera: Conjunctivae normal.   Neck:      Vascular: No carotid bruit or JVD.   Cardiovascular:      Rate and Rhythm: Normal rate and regular rhythm.  No extrasystoles are present.     Pulses:           Carotid pulses are 2+ on the right side and 2+ on the left side.       Radial pulses are 2+ on the right side and 2+ on the left side.      Heart sounds: S1 normal and S2 normal. No murmur heard.    No gallop.   Pulmonary:      Effort: Pulmonary effort is normal. No respiratory distress.      Breath sounds: No wheezing or rales.   Abdominal:      General: Bowel sounds are normal.      Palpations: Abdomen is soft.      Tenderness: There is no abdominal tenderness.   Lymphadenopathy:      Cervical: No cervical adenopathy.   Skin:     General: Skin is warm and dry.   Neurological:      Mental Status: She is alert.   Psychiatric:         Speech: Speech normal.         Labs:  Lab Results   Component Value Date    CHOL 237 (H) 07/09/2021    TRIG 70 07/09/2021    HDL 67 07/09/2021      Fom 2/15/21: creatinine 0.94.  From 2/19/20: total cholesterol 255, triglycerides 98, HDL 69, , TSH 4.96, AST 19, ALT 17, sodium 140, potassium 4.2, BUN 12, creatinine 0.98.  Personally reviewed, my interpretation: elevated LDL measuring 158.     Cardiovascular Studies:   1.  Echocardiogram 7/16/2020 (personally reviewed): Normal left ventricular cavity size with low normal systolic function, EF 50 to 55%.  No regional wall motion abnormalities.  Normal  right ventricular size with preserved systolic function.  Normal left and right atrial size.  Trileaflet aortic valve with mild sclerosis.  Trace mitral regurgitation.  Trace pulmonary regurgitation.  Mild tricuspid regurgitation.  Estimated RVSP 19 mmHg.  No pericardial effusion.  2.  Coronary calcium score 2/27/2020: Coronary calcium score of 0.    ECG from today personally reviewed and discussed with the patient shows sinus rhythm, normal tracing.    Assessment/Plan   Problem List Items Addressed This Visit        Endocrine/Metabolic    Mixed hyperlipidemia - Primary     From 7/9/21: total cholesterol 237, triglycerides 70, HDL 67, .  Elevated LDL.  Coronary calcium score 2/27/2020: Composite 0.  Estimated 1-year risk of ASCVD 3.2%. Statin therapy not currently indicated.  --Recommend repeat fasting lipids, lab prescription given.  --Lifestyle modification discussed at length, dietary changes stressed.           Relevant Orders    ECG 12 LEAD-OFFICE PERFORMED (Completed)    Lipid panel    Comprehensive metabolic panel       Infectious/Inflammatory    Sarcoidosis, unspecified     Pulmonary sarcoidosis diagnosed with bronchoscopy.  No symptoms to suggest cardiac involvement.  EKG today normal.  Echocardiogram 7/16/2020 personally reviewed and shows no regional wall motion abnormalities.  --Reviewed concerning cardiac symptoms, call with any.                    This letter was generated using speech recognition software. Please excuse any typographical errors.    Return in about 1 year (around 6/30/2023).        [facility] Ho MD, [facility]

## 2022-06-30 NOTE — ASSESSMENT & PLAN NOTE
From 7/9/21: total cholesterol 237, triglycerides 70, HDL 67, .  Elevated LDL.  Coronary calcium score 2/27/2020: Composite 0.  Estimated 1-year risk of ASCVD 3.2%. Statin therapy not currently indicated.  --Recommend repeat fasting lipids, lab prescription given.  --Lifestyle modification discussed at length, dietary changes stressed.

## 2022-06-30 NOTE — ASSESSMENT & PLAN NOTE
Pulmonary sarcoidosis diagnosed with bronchoscopy.  No symptoms to suggest cardiac involvement.  EKG today normal.  Echocardiogram 7/16/2020 personally reviewed and shows no regional wall motion abnormalities.  --Reviewed concerning cardiac symptoms, call with any.

## 2022-07-19 ENCOUNTER — HOSPITAL ENCOUNTER (OUTPATIENT)
Dept: RADIOLOGY | Age: 62
Discharge: HOME | End: 2022-07-19
Attending: INTERNAL MEDICINE
Payer: COMMERCIAL

## 2022-07-19 DIAGNOSIS — M81.0 AGE-RELATED OSTEOPOROSIS WITHOUT CURRENT PATHOLOGICAL FRACTURE: ICD-10-CM

## 2022-07-19 PROCEDURE — 77080 DXA BONE DENSITY AXIAL: CPT

## 2022-09-02 ENCOUNTER — TRANSCRIBE ORDERS (OUTPATIENT)
Dept: SCHEDULING | Age: 62
End: 2022-09-02

## 2022-09-02 DIAGNOSIS — D86.9 SARCOIDOSIS, UNSPECIFIED: Primary | ICD-10-CM

## 2022-09-07 ENCOUNTER — HOSPITAL ENCOUNTER (OUTPATIENT)
Dept: RADIOLOGY | Age: 62
Discharge: HOME | End: 2022-09-07
Attending: INTERNAL MEDICINE
Payer: COMMERCIAL

## 2022-09-07 DIAGNOSIS — D86.9 SARCOIDOSIS, UNSPECIFIED: ICD-10-CM

## 2022-09-07 PROCEDURE — 71250 CT THORAX DX C-: CPT

## 2022-09-14 LAB
ALBUMIN SERPL-MCNC: 4.6 G/DL (ref 3.8–4.8)
ALBUMIN/GLOB SERPL: 2.1 {RATIO} (ref 1.2–2.2)
ALP SERPL-CCNC: 64 IU/L (ref 44–121)
ALT SERPL-CCNC: 18 IU/L (ref 0–32)
AST SERPL-CCNC: 22 IU/L (ref 0–40)
BILIRUB SERPL-MCNC: 0.3 MG/DL (ref 0–1.2)
BUN SERPL-MCNC: 19 MG/DL (ref 8–27)
BUN/CREAT SERPL: 19 (ref 12–28)
CALCIUM SERPL-MCNC: 10.4 MG/DL (ref 8.7–10.3)
CHLORIDE SERPL-SCNC: 102 MMOL/L (ref 96–106)
CHOLEST SERPL-MCNC: 250 MG/DL (ref 100–199)
CO2 SERPL-SCNC: 25 MMOL/L (ref 20–29)
CREAT SERPL-MCNC: 1.01 MG/DL (ref 0.57–1)
EGFRCR-CYS SERPLBLD CKD-EPI 2021: 63 ML/MIN/1.73
GLOBULIN SER CALC-MCNC: 2.2 G/DL (ref 1.5–4.5)
GLUCOSE SERPL-MCNC: 96 MG/DL (ref 65–99)
HDLC SERPL-MCNC: 70 MG/DL
LDLC SERPL CALC-MCNC: 167 MG/DL (ref 0–99)
POTASSIUM SERPL-SCNC: 5.1 MMOL/L (ref 3.5–5.2)
PROT SERPL-MCNC: 6.8 G/DL (ref 6–8.5)
SODIUM SERPL-SCNC: 139 MMOL/L (ref 134–144)
SPECIMEN STATUS: NORMAL
TRIGL SERPL-MCNC: 79 MG/DL (ref 0–149)
VLDLC SERPL CALC-MCNC: 13 MG/DL (ref 5–40)

## 2022-09-23 NOTE — RESULT ENCOUNTER NOTE
Spoke with patient.  Informed chol/LDL higher.  Informed CVD risk low and prior calcium score 0,  So okay to hold off on medication for now.  Informed calcium a little high 10.4.  States she is on Prolia and elevates chol and calcium.  Recommended heart healthy diet and exercise to ability.

## 2022-10-03 ENCOUNTER — HOSPITAL ENCOUNTER (OUTPATIENT)
Dept: RADIOLOGY | Age: 62
Discharge: HOME | End: 2022-10-03
Attending: SPECIALIST
Payer: COMMERCIAL

## 2022-10-03 ENCOUNTER — TRANSCRIBE ORDERS (OUTPATIENT)
Dept: RADIOLOGY | Age: 62
End: 2022-10-03

## 2022-10-03 DIAGNOSIS — Z12.31 ENCOUNTER FOR SCREENING MAMMOGRAM FOR MALIGNANT NEOPLASM OF BREAST: Primary | ICD-10-CM

## 2022-10-03 DIAGNOSIS — Z12.31 ENCOUNTER FOR SCREENING MAMMOGRAM FOR MALIGNANT NEOPLASM OF BREAST: ICD-10-CM

## 2022-10-03 PROCEDURE — 77067 SCR MAMMO BI INCL CAD: CPT

## 2022-12-05 ENCOUNTER — APPOINTMENT (RX ONLY)
Dept: URBAN - METROPOLITAN AREA CLINIC 26 | Facility: CLINIC | Age: 62
Setting detail: DERMATOLOGY
End: 2022-12-05

## 2022-12-05 DIAGNOSIS — L85.3 XEROSIS CUTIS: ICD-10-CM | Status: WELL CONTROLLED

## 2022-12-05 DIAGNOSIS — D22 MELANOCYTIC NEVI: ICD-10-CM

## 2022-12-05 DIAGNOSIS — Z85.828 PERSONAL HISTORY OF OTHER MALIGNANT NEOPLASM OF SKIN: ICD-10-CM

## 2022-12-05 DIAGNOSIS — L82.1 OTHER SEBORRHEIC KERATOSIS: ICD-10-CM

## 2022-12-05 DIAGNOSIS — L82.0 INFLAMED SEBORRHEIC KERATOSIS: ICD-10-CM

## 2022-12-05 DIAGNOSIS — D18.0 HEMANGIOMA: ICD-10-CM

## 2022-12-05 DIAGNOSIS — L81.4 OTHER MELANIN HYPERPIGMENTATION: ICD-10-CM

## 2022-12-05 PROBLEM — D22.5 MELANOCYTIC NEVI OF TRUNK: Status: ACTIVE | Noted: 2022-12-05

## 2022-12-05 PROBLEM — D18.01 HEMANGIOMA OF SKIN AND SUBCUTANEOUS TISSUE: Status: ACTIVE | Noted: 2022-12-05

## 2022-12-05 PROCEDURE — ? LIQUID NITROGEN

## 2022-12-05 PROCEDURE — ? FULL BODY SKIN EXAM

## 2022-12-05 PROCEDURE — 17110 DESTRUCTION B9 LES UP TO 14: CPT

## 2022-12-05 PROCEDURE — ? COUNSELING

## 2022-12-05 PROCEDURE — ? SUNSCREEN RECOMMENDATIONS

## 2022-12-05 PROCEDURE — 99213 OFFICE O/P EST LOW 20 MIN: CPT | Mod: 25

## 2022-12-05 PROCEDURE — ? PRESCRIPTION

## 2022-12-05 RX ORDER — TRIAMCINOLONE ACETONIDE 1 MG/G
CREAM TOPICAL BID
Qty: 1 | Refills: 3 | Status: ERX | COMMUNITY
Start: 2022-12-05

## 2022-12-05 RX ADMIN — TRIAMCINOLONE ACETONIDE 1: 1 CREAM TOPICAL at 00:00

## 2022-12-05 ASSESSMENT — LOCATION SIMPLE DESCRIPTION DERM
LOCATION SIMPLE: UPPER BACK
LOCATION SIMPLE: RIGHT UPPER BACK

## 2022-12-05 ASSESSMENT — LOCATION DETAILED DESCRIPTION DERM
LOCATION DETAILED: RIGHT MID-UPPER BACK
LOCATION DETAILED: SUPERIOR THORACIC SPINE

## 2022-12-05 ASSESSMENT — LOCATION ZONE DERM: LOCATION ZONE: TRUNK

## 2022-12-05 NOTE — PROCEDURE: LIQUID NITROGEN
Duration Of Freeze Thaw-Cycle (Seconds): 16
Spray Paint Technique: No
Post-Care Instructions: I reviewed with the patient in detail post-care instructions. Patient is to wear sunprotection, and avoid picking at any of the treated lesions. Pt may apply Vaseline to crusted or scabbing areas.
Consent: The patient's verbal consent was obtained including but not limited to risks of crusting, scabbing, blistering, scarring, darker or lighter pigmentary change, recurrence, incomplete removal and infection.
Show Spray Paint Technique Variable?: Yes
Medical Necessity Information: It is in your best interest to select a reason for this procedure from the list below. All of these items fulfill various CMS LCD requirements except the new and changing color options.
Spray Paint Text: The liquid nitrogen was applied to the skin utilizing a spray paint frosting technique.
Application Tool (Optional): Liquid Nitrogen Sprayer
Medical Necessity Clause: This procedure was medically necessary because the lesions that were treated were:
Detail Level: Detailed
Number Of Freeze-Thaw Cycles: 2 freeze-thaw cycles

## 2022-12-09 ENCOUNTER — RX ONLY (OUTPATIENT)
Age: 62
Setting detail: RX ONLY
End: 2022-12-09

## 2022-12-09 RX ORDER — TACROLIMUS 1 MG/G
OINTMENT TOPICAL
Qty: 30 | Refills: 3 | Status: ERX

## 2023-03-08 ENCOUNTER — TELEPHONE (OUTPATIENT)
Dept: CARDIOLOGY | Facility: CLINIC | Age: 63
End: 2023-03-08
Payer: COMMERCIAL

## 2023-03-08 ENCOUNTER — TELEPHONE (OUTPATIENT)
Dept: SCHEDULING | Facility: CLINIC | Age: 63
End: 2023-03-08
Payer: COMMERCIAL

## 2023-03-08 DIAGNOSIS — E78.2 MIXED HYPERLIPIDEMIA: Primary | ICD-10-CM

## 2023-03-08 NOTE — TELEPHONE ENCOUNTER
Lab/Test Order Slip Needed   Pt also want to check Vitamin D level- script needed    Name of caller: Monica Ljoa      Relationship to patient: self     Name of patient: Monica Purvi    Name of physician: Daphney Ho MD    Type of test: blood work script     Indicate whether patient would like order slip mailed to them or faxed/electronically sent to facility: mail to home address     Name and fax number of facility: home    Best contact number: [phone]

## 2023-03-08 NOTE — TELEPHONE ENCOUNTER
Alissa:  Lipid/A1c/Vit D level ordered.  Patient asked we mail lab slips.  Can you please mail.  Thank you.

## 2023-03-28 LAB
CHOLEST SERPL-MCNC: 237 MG/DL (ref 100–199)
HBA1C MFR BLD: 5.4 % (ref 4.8–5.6)
HDLC SERPL-MCNC: 62 MG/DL
LDLC SERPL CALC-MCNC: 147 MG/DL (ref 0–99)
SPECIMEN STATUS: NORMAL
TRIGL SERPL-MCNC: 156 MG/DL (ref 0–149)
VLDLC SERPL CALC-MCNC: 28 MG/DL (ref 5–40)

## 2023-03-29 LAB — 1,25(OH)2D SERPL-MCNC: 63.7 PG/ML (ref 24.8–81.5)

## 2023-05-22 ENCOUNTER — APPOINTMENT (RX ONLY)
Dept: URBAN - METROPOLITAN AREA CLINIC 26 | Facility: CLINIC | Age: 63
Setting detail: DERMATOLOGY
End: 2023-05-22

## 2023-05-22 DIAGNOSIS — Z85.828 PERSONAL HISTORY OF OTHER MALIGNANT NEOPLASM OF SKIN: ICD-10-CM

## 2023-05-22 DIAGNOSIS — L57.0 ACTINIC KERATOSIS: ICD-10-CM

## 2023-05-22 DIAGNOSIS — L82.1 OTHER SEBORRHEIC KERATOSIS: ICD-10-CM

## 2023-05-22 DIAGNOSIS — L82.0 INFLAMED SEBORRHEIC KERATOSIS: ICD-10-CM

## 2023-05-22 DIAGNOSIS — D22 MELANOCYTIC NEVI: ICD-10-CM

## 2023-05-22 DIAGNOSIS — D18.0 HEMANGIOMA: ICD-10-CM

## 2023-05-22 DIAGNOSIS — L72.0 EPIDERMAL CYST: ICD-10-CM

## 2023-05-22 DIAGNOSIS — L81.4 OTHER MELANIN HYPERPIGMENTATION: ICD-10-CM

## 2023-05-22 PROBLEM — D23.71 OTHER BENIGN NEOPLASM OF SKIN OF RIGHT LOWER LIMB, INCLUDING HIP: Status: ACTIVE | Noted: 2023-05-22

## 2023-05-22 PROBLEM — D18.01 HEMANGIOMA OF SKIN AND SUBCUTANEOUS TISSUE: Status: ACTIVE | Noted: 2023-05-22

## 2023-05-22 PROBLEM — D22.5 MELANOCYTIC NEVI OF TRUNK: Status: ACTIVE | Noted: 2023-05-22

## 2023-05-22 PROCEDURE — ? LIQUID NITROGEN

## 2023-05-22 PROCEDURE — 17003 DESTRUCT PREMALG LES 2-14: CPT | Mod: 59

## 2023-05-22 PROCEDURE — ? FULL BODY SKIN EXAM

## 2023-05-22 PROCEDURE — 17110 DESTRUCTION B9 LES UP TO 14: CPT

## 2023-05-22 PROCEDURE — ? SUNSCREEN RECOMMENDATIONS

## 2023-05-22 PROCEDURE — ? COUNSELING

## 2023-05-22 PROCEDURE — 99213 OFFICE O/P EST LOW 20 MIN: CPT | Mod: 25

## 2023-05-22 PROCEDURE — 17000 DESTRUCT PREMALG LESION: CPT | Mod: 59

## 2023-05-22 ASSESSMENT — LOCATION DETAILED DESCRIPTION DERM
LOCATION DETAILED: LEFT INFERIOR FOREHEAD
LOCATION DETAILED: LEFT ANTERIOR PROXIMAL UPPER ARM
LOCATION DETAILED: LEFT LATERAL CANTHUS
LOCATION DETAILED: LEFT PROXIMAL PRETIBIAL REGION
LOCATION DETAILED: SUPERIOR THORACIC SPINE
LOCATION DETAILED: LEFT MEDIAL INFERIOR EYELID

## 2023-05-22 ASSESSMENT — LOCATION SIMPLE DESCRIPTION DERM
LOCATION SIMPLE: LEFT INFERIOR EYELID
LOCATION SIMPLE: LEFT EYELID
LOCATION SIMPLE: LEFT FOREHEAD
LOCATION SIMPLE: LEFT PRETIBIAL REGION
LOCATION SIMPLE: LEFT UPPER ARM
LOCATION SIMPLE: UPPER BACK

## 2023-05-22 ASSESSMENT — LOCATION ZONE DERM
LOCATION ZONE: TRUNK
LOCATION ZONE: EYELID
LOCATION ZONE: ARM
LOCATION ZONE: FACE
LOCATION ZONE: LEG

## 2023-05-22 NOTE — PROCEDURE: LIQUID NITROGEN
Show Applicator Variable?: Yes
Application Tool (Optional): Liquid Nitrogen Sprayer
Render Note In Bullet Format When Appropriate: No
Post-Care Instructions: I reviewed with the patient in detail post-care instructions. Patient is to wear sunprotection, and avoid picking at any of the treated lesions. Pt may apply Vaseline to crusted or scabbing areas.
Number Of Freeze-Thaw Cycles: 2 freeze-thaw cycles
Consent: The patient's verbal consent was obtained including but not limited to risks of crusting, scabbing, blistering, scarring, darker or lighter pigmentary change, recurrence, incomplete removal and infection. Verbal consent also obtained.
Detail Level: Detailed
Duration Of Freeze Thaw-Cycle (Seconds): 15
Medical Necessity Clause: This procedure was medically necessary because the lesions that were treated were:
Medical Necessity Information: It is in your best interest to select a reason for this procedure from the list below. All of these items fulfill various CMS LCD requirements except the new and changing color options.
Consent: The patient's verbal consent was obtained including but not limited to risks of crusting, scabbing, blistering, scarring, darker or lighter pigmentary change, recurrence, incomplete removal and infection.
Spray Paint Text: The liquid nitrogen was applied to the skin utilizing a spray paint frosting technique.
Duration Of Freeze Thaw-Cycle (Seconds): 25

## 2023-07-14 ENCOUNTER — OFFICE VISIT (OUTPATIENT)
Dept: CARDIOLOGY | Facility: CLINIC | Age: 63
End: 2023-07-14
Payer: COMMERCIAL

## 2023-07-14 ENCOUNTER — TELEPHONE (OUTPATIENT)
Dept: CARDIOLOGY | Facility: CLINIC | Age: 63
End: 2023-07-14

## 2023-07-14 VITALS
WEIGHT: 133 LBS | HEART RATE: 61 BPM | HEIGHT: 64 IN | DIASTOLIC BLOOD PRESSURE: 70 MMHG | SYSTOLIC BLOOD PRESSURE: 126 MMHG | OXYGEN SATURATION: 99 % | BODY MASS INDEX: 22.71 KG/M2

## 2023-07-14 DIAGNOSIS — E78.2 MIXED HYPERLIPIDEMIA: ICD-10-CM

## 2023-07-14 DIAGNOSIS — R94.31 ABNORMAL EKG: ICD-10-CM

## 2023-07-14 DIAGNOSIS — D86.9 SARCOIDOSIS, UNSPECIFIED: Primary | ICD-10-CM

## 2023-07-14 PROCEDURE — 3008F BODY MASS INDEX DOCD: CPT | Performed by: INTERNAL MEDICINE

## 2023-07-14 PROCEDURE — 99214 OFFICE O/P EST MOD 30 MIN: CPT | Performed by: INTERNAL MEDICINE

## 2023-07-14 PROCEDURE — 93000 ELECTROCARDIOGRAM COMPLETE: CPT | Performed by: INTERNAL MEDICINE

## 2023-07-14 NOTE — LETTER
July 17, 2023     Susy Leong MD  [address] [facility] [address]    Patient: Monica Loja  YOB: 1960  Date of Visit: 7/14/2023      Dear Dr. Leong:    Thank you for referring Monica Loja to me for evaluation. Below are my notes for this consultation.    If you have questions, please do not hesitate to call me. I look forward to following your patient along with you.         Sincerely,        Daphney Ho MD        CC: No Recipients    Daphney Ho MD  7/17/2023 10:00 AM  Sign when Signing Visit   Daphney Ho MD, [facility]  Cardiology    [facility]  The Heart [facility] Level  [address] [place] [address]    TEL  [phone]  [facility]/ml     July 14, 2023    Reason for visit: Cardiovascular follow-up.    Monica Loja is a 63 y.o. female who presents for cardiovascular follow-up. Monica has a history of hyperlipidemia, premature menopause, and pulmonary sarcoidosis.  I last saw her in the office on June 30, 2022 at which time she was feeling well.    Interval history obtained from the patient and review of all available medical records.  She underwent repeat CT chest on September 7, 2022 showing an increase in the number of pulmonary micronodules and micronodules suggestive of sarcoidosis.  She had a concussion November. He mother passed away in November.   January she had severe debilitating vertigo. At one point there was suspicion for stroke, imaging was negative.   She underwent updated fasting lipids on March 27, 2023 as outlined below.    Today, Monica reports feeling well from a cardiovascular standpoint.   She is without chest pain, shortness of breath, orthopnea, PND, lower extremity edema, palpitations, dizziness, lightheadedness, or syncope.   She remains off therapy for pulmonary sarcoidosis, however will be establishing care with Dr. Glover.      Past Medical History:  1. Pulmonary  sarcoidosis  2. Hyperlipidemia  3. Osteoporosis  4. Premature menopause  5. Obstetric history:   labor    Past Surgical History:  1. Tonsillectomy  2. Pulmonary biopsy    Medications: denosumab SQ as directed, vitamin D, fish oil.    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     Family History: Mother has hyperlipidemia and diabetes.     Exam:  Objective   Vitals:    23 1034   BP: 126/70   Pulse: 61   SpO2: 99%     Body mass index is 22.83 kg/m².  Physical Exam  Constitutional:       General: She is not in acute distress.  Cardiovascular:      Rate and Rhythm: Normal rate.      Pulses: Normal pulses.      Heart sounds: No murmur heard.  Pulmonary:      Effort: Pulmonary effort is normal. No respiratory distress.      Breath sounds: Normal breath sounds.   Musculoskeletal:      Right lower leg: No edema.      Left lower leg: No edema.   Skin:     General: Skin is warm and dry.   Neurological:      Mental Status: She is alert.         Labs:  Lab Results   Component Value Date    CHOL 237 (H) 2023    TRIG 156 (H) 2023    HDL 62 2023    ALT 18 2022    AST 22 2022     2022    K 5.1 2022    CREATININE 1.01 (H) 2022    HGBA1C 5.4 2023      Fom 2/15/21: creatinine 0.94.  From 20: total cholesterol 255, triglycerides 98, HDL 69, , TSH 4.96, AST 19, ALT 17, sodium 140, potassium 4.2, BUN 12, creatinine 0.98.  Personally reviewed, my interpretation: elevated LDL measuring 147.     Cardiovascular Studies:   1.  Echocardiogram 2020 (personally reviewed): Normal left ventricular cavity size with low normal systolic function, EF 50 to 55%.  No regional wall motion abnormalities.  Normal right ventricular size with preserved systolic function.  Normal left and right atrial size.  Trileaflet aortic valve with mild sclerosis.  Trace mitral regurgitation.   Trace pulmonary regurgitation.  Mild tricuspid regurgitation.  Estimated RVSP 19 mmHg.  No pericardial effusion.  2.  Coronary calcium score 2/27/2020: Coronary calcium score of 0.    ECG from today personally reviewed and discussed with the patient shows sinus rhythm with PAC and nonspecific ST changes.     Assessment/Plan   Problem List Items Addressed This Visit        Circulatory    Abnormal EKG     ECG from today shows sinus rhythm with nonspecific ST changes.  Given new ST changes although nonspecific as well CT chest suggesting increased findings of pulmonary sarcoidosis, recommend cardiac MRI to assess for myocardial involvement.  -- Cardiac MRI.         Relevant Orders    MRI CARDIAC WITH AND WITHOUT CONTRAST    MRI CARDIAC MAPPING WITHOUT CONTRAST       Infectious/Inflammatory    Sarcoidosis, unspecified - Primary     ECG from today shows sinus rhythm with nonspecific ST changes.  Given new ST changes although nonspecific as well CT chest suggesting increased findings of pulmonary sarcoidosis, recommend cardiac MRI to assess for myocardial involvement.  Asymptomatic.  -- Cardiac MRI.         Relevant Orders    ECG 12 lead (Completed)    MRI CARDIAC WITH AND WITHOUT CONTRAST    MRI CARDIAC MAPPING WITHOUT CONTRAST       Other    Mixed hyperlipidemia     From 3/27/2023: Total cholesterol 237, triglycerides 156, HDL 62, .    Elevated LDL.  Coronary calcium score 2/27/2020: Composite 0.  Estimated 1-year risk of ASCVD 3.8%.   --Statin therapy not currently indicated.  --Lifestyle modification discussed at length, dietary changes stressed.              This letter was generated using speech recognition software. Please excuse any typographical errors.    Return in about 1 year (around 7/14/2024).        Daphney Ho MD, [facility]

## 2023-07-14 NOTE — TELEPHONE ENCOUNTER
Requesting precert for both MRI to be completed at [facility] and scheduled once precert is obtained. [phone]

## 2023-07-14 NOTE — ASSESSMENT & PLAN NOTE
From 3/27/2023: Total cholesterol 237, triglycerides 156, HDL 62, .    Elevated LDL.  Coronary calcium score 2/27/2020: Composite 0.  Estimated 1-year risk of ASCVD 3.8%.   --Statin therapy not currently indicated.  --Lifestyle modification discussed at length, dietary changes stressed.

## 2023-07-14 NOTE — PROGRESS NOTES
Daphney Ho MD, [facility]  Cardiology    [facility]    [facility]  [facility] [address]  [address] [place] [address]    TEL  [phone]  [facility]     2023    Reason for visit: Cardiovascular follow-up.    Monica Loja is a 63 y.o. female who presents for cardiovascular follow-up. Monica has a history of hyperlipidemia, premature menopause, and pulmonary sarcoidosis.  I last saw her in the office on 2022 at which time she was feeling well.    Interval history obtained from the patient and review of all available medical records.  She underwent repeat CT chest on 2022 showing an increase in the number of pulmonary micronodules and micronodules suggestive of sarcoidosis.  She had a concussion November. He mother passed away in November.   January she had severe debilitating vertigo. At one point there was suspicion for stroke, imaging was negative.   She underwent updated fasting lipids on 2023 as outlined below.    Today, Monica reports feeling well from a cardiovascular standpoint.   She is without chest pain, shortness of breath, orthopnea, PND, lower extremity edema, palpitations, dizziness, lightheadedness, or syncope.   She remains off therapy for pulmonary sarcoidosis, however will be establishing care with Dr. Glover.      Past Medical History:  1. Pulmonary sarcoidosis  2. Hyperlipidemia  3. Osteoporosis  4. Premature menopause  5. Obstetric history:   labor    Past Surgical History:  1. Tonsillectomy  2. Pulmonary biopsy    Medications: denosumab SQ as directed, vitamin D, fish oil.    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     Family History: Mother has hyperlipidemia and diabetes.     Exam:  Objective   Vitals:    23 1034   BP: 126/70   Pulse: 61   SpO2: 99%     Body mass index is 22.83  kg/m².  Physical Exam  Constitutional:       General: She is not in acute distress.  Cardiovascular:      Rate and Rhythm: Normal rate.      Pulses: Normal pulses.      Heart sounds: No murmur heard.  Pulmonary:      Effort: Pulmonary effort is normal. No respiratory distress.      Breath sounds: Normal breath sounds.   Musculoskeletal:      Right lower leg: No edema.      Left lower leg: No edema.   Skin:     General: Skin is warm and dry.   Neurological:      Mental Status: She is alert.         Labs:  Lab Results   Component Value Date    CHOL 237 (H) 03/27/2023    TRIG 156 (H) 03/27/2023    HDL 62 03/27/2023    ALT 18 09/13/2022    AST 22 09/13/2022     09/13/2022    K 5.1 09/13/2022    CREATININE 1.01 (H) 09/13/2022    HGBA1C 5.4 03/27/2023      Fom 2/15/21: creatinine 0.94.  From 2/19/20: total cholesterol 255, triglycerides 98, HDL 69, , TSH 4.96, AST 19, ALT 17, sodium 140, potassium 4.2, BUN 12, creatinine 0.98.  Personally reviewed, my interpretation: elevated LDL measuring 147.     Cardiovascular Studies:   1.  Echocardiogram 7/16/2020 (personally reviewed): Normal left ventricular cavity size with low normal systolic function, EF 50 to 55%.  No regional wall motion abnormalities.  Normal right ventricular size with preserved systolic function.  Normal left and right atrial size.  Trileaflet aortic valve with mild sclerosis.  Trace mitral regurgitation.  Trace pulmonary regurgitation.  Mild tricuspid regurgitation.  Estimated RVSP 19 mmHg.  No pericardial effusion.  2.  Coronary calcium score 2/27/2020: Coronary calcium score of 0.    ECG from today personally reviewed and discussed with the patient shows sinus rhythm with PAC and nonspecific ST changes.     Assessment/Plan   Problem List Items Addressed This Visit        Circulatory    Abnormal EKG     ECG from today shows sinus rhythm with nonspecific ST changes.  Given new ST changes although nonspecific as well CT chest suggesting  increased findings of pulmonary sarcoidosis, recommend cardiac MRI to assess for myocardial involvement.  -- Cardiac MRI.         Relevant Orders    MRI CARDIAC WITH AND WITHOUT CONTRAST    MRI CARDIAC MAPPING WITHOUT CONTRAST       Infectious/Inflammatory    Sarcoidosis, unspecified - Primary     ECG from today shows sinus rhythm with nonspecific ST changes.  Given new ST changes although nonspecific as well CT chest suggesting increased findings of pulmonary sarcoidosis, recommend cardiac MRI to assess for myocardial involvement.  Asymptomatic.  -- Cardiac MRI.         Relevant Orders    ECG 12 lead (Completed)    MRI CARDIAC WITH AND WITHOUT CONTRAST    MRI CARDIAC MAPPING WITHOUT CONTRAST       Other    Mixed hyperlipidemia     From 3/27/2023: Total cholesterol 237, triglycerides 156, HDL 62, .    Elevated LDL.  Coronary calcium score 2/27/2020: Composite 0.  Estimated 1-year risk of ASCVD 3.8%.   --Statin therapy not currently indicated.  --Lifestyle modification discussed at length, dietary changes stressed.               This letter was generated using speech recognition software. Please excuse any typographical errors.    Return in about 1 year (around 7/14/2024).        Daphney Ho MD, [facility]     ADDENDUM 1/29/2024: No cardiovascular 100 patient colonoscopy with low anticipated cardiac risk.

## 2023-07-17 ENCOUNTER — TRANSCRIBE ORDERS (OUTPATIENT)
Dept: SCHEDULING | Age: 63
End: 2023-07-17

## 2023-07-17 ENCOUNTER — APPOINTMENT (RX ONLY)
Dept: URBAN - METROPOLITAN AREA CLINIC 26 | Facility: CLINIC | Age: 63
Setting detail: DERMATOLOGY
End: 2023-07-17

## 2023-07-17 DIAGNOSIS — L57.0 ACTINIC KERATOSIS: ICD-10-CM

## 2023-07-17 DIAGNOSIS — L85.3 XEROSIS CUTIS: ICD-10-CM

## 2023-07-17 DIAGNOSIS — D86.9 SARCOIDOSIS, UNSPECIFIED: Primary | ICD-10-CM

## 2023-07-17 PROCEDURE — ? LIQUID NITROGEN

## 2023-07-17 PROCEDURE — ? TREATMENT REGIMEN

## 2023-07-17 PROCEDURE — ? PRESCRIPTION

## 2023-07-17 PROCEDURE — 17003 DESTRUCT PREMALG LES 2-14: CPT

## 2023-07-17 PROCEDURE — ? COUNSELING

## 2023-07-17 PROCEDURE — 99213 OFFICE O/P EST LOW 20 MIN: CPT | Mod: 25

## 2023-07-17 PROCEDURE — 17000 DESTRUCT PREMALG LESION: CPT

## 2023-07-17 RX ORDER — TRIAMCINOLONE ACETONIDE 1 MG/G
CREAM TOPICAL BID
Qty: 1 | Refills: 3 | Status: ERX

## 2023-07-17 RX ORDER — FLUOROURACIL 2 G/40G
CREAM TOPICAL BID
Qty: 40 | Refills: 3 | Status: ERX | COMMUNITY
Start: 2023-07-17

## 2023-07-17 RX ADMIN — FLUOROURACIL: 2 CREAM TOPICAL at 00:00

## 2023-07-17 ASSESSMENT — LOCATION ZONE DERM
LOCATION ZONE: FACE
LOCATION ZONE: ARM
LOCATION ZONE: NOSE
LOCATION ZONE: EAR

## 2023-07-17 ASSESSMENT — LOCATION DETAILED DESCRIPTION DERM
LOCATION DETAILED: RIGHT TRIANGULAR FOSSA
LOCATION DETAILED: LEFT INFERIOR MEDIAL MALAR CHEEK
LOCATION DETAILED: NASAL DORSUM
LOCATION DETAILED: RIGHT ANTERIOR LATERAL PROXIMAL UPPER ARM
LOCATION DETAILED: LEFT SUPERIOR MEDIAL MALAR CHEEK

## 2023-07-17 ASSESSMENT — LOCATION SIMPLE DESCRIPTION DERM
LOCATION SIMPLE: RIGHT EAR
LOCATION SIMPLE: LEFT CHEEK
LOCATION SIMPLE: RIGHT UPPER ARM
LOCATION SIMPLE: NOSE

## 2023-07-17 NOTE — TELEPHONE ENCOUNTER
She needs to go to [place]. [facility] is not doing Cardiac MRIs at this time. TY      Working on this auth now

## 2023-07-17 NOTE — PROCEDURE: LIQUID NITROGEN
Application Tool (Optional): Liquid Nitrogen Sprayer
Show Applicator Variable?: Yes
Number Of Freeze-Thaw Cycles: 2 freeze-thaw cycles
Post-Care Instructions: I reviewed with the patient in detail post-care instructions. Patient is to wear sunprotection, and avoid picking at any of the treated lesions. Pt may apply Vaseline to crusted or scabbing areas.
Duration Of Freeze Thaw-Cycle (Seconds): 15
Detail Level: Detailed
Render Note In Bullet Format When Appropriate: No
Consent: The patient's verbal consent was obtained including but not limited to risks of crusting, scabbing, blistering, scarring, darker or lighter pigmentary change, recurrence, incomplete removal and infection. Verbal consent also obtained.

## 2023-07-17 NOTE — PROCEDURE: TREATMENT REGIMEN
Continue Regimen: Tacrolimus bid prn maintenance
Initiate Treatment: Triamcinolone bud prn flare x 2 weeks
Plan: Written directives given
Detail Level: Simple

## 2023-07-17 NOTE — ASSESSMENT & PLAN NOTE
ECG from today shows sinus rhythm with nonspecific ST changes.  Given new ST changes although nonspecific as well CT chest suggesting increased findings of pulmonary sarcoidosis, recommend cardiac MRI to assess for myocardial involvement.  -- Cardiac MRI.

## 2023-07-17 NOTE — TELEPHONE ENCOUNTER
Pt called back regarding MRIs.     Pt states that Dr. Ho had advised pt to schedule at [facility].    Pt wanted to clarify whether she should schedule tests at [facility] or [facility].     Pt can be reached at [phone].     Ty.

## 2023-07-17 NOTE — TELEPHONE ENCOUNTER
Spoke with the patient with the information to go ahead and schedule her MRI approved for [facility]

## 2023-07-17 NOTE — ASSESSMENT & PLAN NOTE
ECG from today shows sinus rhythm with nonspecific ST changes.  Given new ST changes although nonspecific as well CT chest suggesting increased findings of pulmonary sarcoidosis, recommend cardiac MRI to assess for myocardial involvement.  Asymptomatic.  -- Cardiac MRI.   90.7

## 2023-07-21 ENCOUNTER — TRANSCRIBE ORDERS (OUTPATIENT)
Dept: SCHEDULING | Age: 63
End: 2023-07-21

## 2023-07-21 DIAGNOSIS — D86.9 SARCOIDOSIS, UNSPECIFIED: Primary | ICD-10-CM

## 2023-08-02 PROBLEM — M81.0 OSTEOPOROSIS WITHOUT CURRENT PATHOLOGICAL FRACTURE: Status: ACTIVE | Noted: 2023-08-02

## 2023-08-02 NOTE — PROGRESS NOTES
Assessment/Plan:    Encounter for gynecological examination (general) (routine) without abnormal findings  58 yo , postmenopausal new patient here for well check. No breast or gyn concerns. Struggling with incidental finding of sarcoidosis and subsequent evaluations. Normal breast and pelvic exams, pap done. No h/o abnormal pap. Mammo order given, last 10/2022  Dexa with rheumatology  Colonoscopy , due, provider #s given. Osteoporosis without current pathological fracture  Followed by rheumatology, has been on forteo, prolia and reclast.       Diagnoses and all orders for this visit:    Encounter for gynecological examination (general) (routine) without abnormal findings    Encounter for screening mammogram for malignant neoplasm of breast  -     Mammo screening bilateral w 3d & cad; Future    Screening for malignant neoplasm of the cervix  -     IGP, Aptima HPV, Rfx 16/18,45    Other orders  -     Liquid-based pap, screening  -     zoledronic acid (Reclast) 5 mg/100 mL IV infusion (premix); Inject into a catheter in a vein  -     Multiple Vitamin (MULTIVITAMIN ADULT PO); Take by mouth daily  -     Multiple Vitamins-Minerals (MEGAVITE FRUITS & VEGGIES PO); Take by mouth          Subjective:      Patient ID: Olena Coburn is a 59 y.o. female. HPI New pt here for well check. The following portions of the patient's history were reviewed and updated as appropriate:   She  has a past medical history of Osteoporosis without current pathological fracture, Sarcoidosis, lung ([address]), and Vertigo. She   Patient Active Problem List    Diagnosis Date Noted   • Encounter for gynecological examination (general) (routine) without abnormal findings 2023   • Osteoporosis without current pathological fracture 2023     She  has a past surgical history that includes Mammo (historical) (10/2022); DXA procedure(historical) (2022); Tonsillectomy; and Colonoscopy ().   Her family history includes Diabetes in her father and mother; Osteoporosis in her mother; Thyroid disease in her mother and sister; Uterine cancer in her mother. She  reports that she has never smoked. She has never been exposed to tobacco smoke. She has never used smokeless tobacco. She reports that she does not drink alcohol and does not use drugs. Current Outpatient Medications   Medication Sig Dispense Refill   • Multiple Vitamin (MULTIVITAMIN ADULT PO) Take by mouth daily     • Multiple Vitamins-Minerals (MEGAVITE FRUITS & VEGGIES PO) Take by mouth     • zoledronic acid (Reclast) 5 mg/100 mL IV infusion (premix) Inject into a catheter in a vein       No current facility-administered medications for this visit. She is allergic to penicillins. .    Review of Systems  No PMB, breast, bladder, bowel changes.  No new persistent pain, bloating, early satiety or pelvic pressure      Objective:      /74 (BP Location: Left arm, Patient Position: Sitting, Cuff Size: Standard)   Ht 5' 3.5" (1.613 m)   Wt 60.3 kg (133 lb)   BMI 23.19 kg/m²     Declined chaperone       Physical Exam    General appearance: no distress, pleasant  Mildly anxious  Neck: thyroid without nodules or thyromegaly, no palpable adenopathy  Lymph nodes: no palpable adenopathy  Breasts: no masses, nodes or skin changes  Abdomen: soft, non tender, no palpable masses  Pelvic exam: normal atrophic external genitalia, urethral meatus normal, vagina atrophic without lesions, cervix atrophic without lesions, uterus small, non tender, no adnexal masses, non tender  Rectal exam: normal sphincter tone, no masses, RV confirms above

## 2023-08-04 ENCOUNTER — HOSPITAL ENCOUNTER (OUTPATIENT)
Dept: RADIOLOGY | Age: 63
Discharge: HOME | End: 2023-08-04
Attending: INTERNAL MEDICINE
Payer: COMMERCIAL

## 2023-08-04 ENCOUNTER — TELEPHONE (OUTPATIENT)
Dept: SCHEDULING | Facility: CLINIC | Age: 63
End: 2023-08-04
Payer: COMMERCIAL

## 2023-08-04 ENCOUNTER — OFFICE VISIT (OUTPATIENT)
Dept: OBGYN CLINIC | Facility: CLINIC | Age: 63
End: 2023-08-04
Payer: COMMERCIAL

## 2023-08-04 VITALS
BODY MASS INDEX: 22.71 KG/M2 | WEIGHT: 133 LBS | DIASTOLIC BLOOD PRESSURE: 74 MMHG | HEIGHT: 64 IN | SYSTOLIC BLOOD PRESSURE: 124 MMHG

## 2023-08-04 DIAGNOSIS — D86.9 SARCOIDOSIS, UNSPECIFIED: ICD-10-CM

## 2023-08-04 DIAGNOSIS — Z12.31 ENCOUNTER FOR SCREENING MAMMOGRAM FOR MALIGNANT NEOPLASM OF BREAST: ICD-10-CM

## 2023-08-04 DIAGNOSIS — Z01.419 ENCOUNTER FOR GYNECOLOGICAL EXAMINATION (GENERAL) (ROUTINE) WITHOUT ABNORMAL FINDINGS: Primary | ICD-10-CM

## 2023-08-04 DIAGNOSIS — Z12.4 SCREENING FOR MALIGNANT NEOPLASM OF THE CERVIX: ICD-10-CM

## 2023-08-04 PROCEDURE — S0610 ANNUAL GYNECOLOGICAL EXAMINA: HCPCS | Performed by: OBSTETRICS & GYNECOLOGY

## 2023-08-04 PROCEDURE — 71250 CT THORAX DX C-: CPT

## 2023-08-04 RX ORDER — ZOLEDRONIC ACID 5 MG/100ML
INJECTION, SOLUTION INTRAVENOUS
COMMUNITY

## 2023-08-04 NOTE — ASSESSMENT & PLAN NOTE
58 yo , postmenopausal new patient here for well check. No breast or gyn concerns. Struggling with incidental finding of sarcoidosis and subsequent evaluations. Normal breast and pelvic exams, pap done. No h/o abnormal pap. Mammo order given, last 10/2022  Dexa with rheumatology  Colonoscopy , due, provider #s given.

## 2023-08-04 NOTE — LETTER
2023     Jojo Ni, [address] [address]    Patient: Nevin Carreno   YOB: 1960   Date of Visit: 2023       Dear Dr. Lissa Simmonds: Thank you for referring Pancho Aragon to me for evaluation. Below are my notes for this consultation. If you have questions, please do not hesitate to call me. I look forward to following your patient along with you. Sincerely,        Julio Bacon MD        CC: No Recipients    Julio Bacon MD  2023  9:31 AM  Sign when Signing Visit  Assessment/Plan:    Encounter for gynecological examination (general) (routine) without abnormal findings  58 yo , postmenopausal new patient here for well check. No breast or gyn concerns. Struggling with incidental finding of sarcoidosis and subsequent evaluations. Normal breast and pelvic exams, pap done. No h/o abnormal pap. Mammo order given, last 10/2022  Dexa with rheumatology  Colonoscopy , due, provider #s given. Osteoporosis without current pathological fracture  Followed by rheumatology, has been on forteo, prolia and reclast.      Diagnoses and all orders for this visit:    Encounter for gynecological examination (general) (routine) without abnormal findings    Encounter for screening mammogram for malignant neoplasm of breast  -     Mammo screening bilateral w 3d & cad; Future    Screening for malignant neoplasm of the cervix  -     IGP, Aptima HPV, Rfx 16/18,45    Other orders  -     Liquid-based pap, screening  -     zoledronic acid (Reclast) 5 mg/100 mL IV infusion (premix); Inject into a catheter in a vein  -     Multiple Vitamin (MULTIVITAMIN ADULT PO); Take by mouth daily  -     Multiple Vitamins-Minerals (MEGAVITE FRUITS & VEGGIES PO); Take by mouth         Subjective:     Patient ID: Nevin Carreno is a 59 y.o. female. HPI New pt here for well check.     The following portions of the patient's history were reviewed and updated as appropriate:   She has a past medical history of Osteoporosis without current pathological fracture, Sarcoidosis, lung ([address]), and Vertigo. She   Patient Active Problem List    Diagnosis Date Noted   • Encounter for gynecological examination (general) (routine) without abnormal findings 08/04/2023   • Osteoporosis without current pathological fracture 08/02/2023     She  has a past surgical history that includes Mammo (historical) (10/2022); DXA procedure(historical) (07/2022); Tonsillectomy; and Colonoscopy (2013). Her family history includes Diabetes in her father and mother; Osteoporosis in her mother; Thyroid disease in her mother and sister; Uterine cancer in her mother. She  reports that she has never smoked. She has never been exposed to tobacco smoke. She has never used smokeless tobacco. She reports that she does not drink alcohol and does not use drugs. Current Outpatient Medications   Medication Sig Dispense Refill   • Multiple Vitamin (MULTIVITAMIN ADULT PO) Take by mouth daily     • Multiple Vitamins-Minerals (MEGAVITE FRUITS & VEGGIES PO) Take by mouth     • zoledronic acid (Reclast) 5 mg/100 mL IV infusion (premix) Inject into a catheter in a vein       No current facility-administered medications for this visit. She is allergic to penicillins. .    Review of Systems No PMB, breast, bladder, bowel changes.  No new persistent pain, bloating, early satiety or pelvic pressure      Objective:      /74 (BP Location: Left arm, Patient Position: Sitting, Cuff Size: Standard)   Ht 5' 3.5" (1.613 m)   Wt 60.3 kg (133 lb)   BMI 23.19 kg/m²     Declined chaperone      Physical Exam   General appearance: no distress, pleasant  Mildly anxious  Neck: thyroid without nodules or thyromegaly, no palpable adenopathy  Lymph nodes: no palpable adenopathy  Breasts: no masses, nodes or skin changes  Abdomen: soft, non tender, no palpable masses  Pelvic exam: normal atrophic external genitalia, urethral meatus normal, vagina atrophic without lesions, cervix atrophic without lesions, uterus small, non tender, no adnexal masses, non tender  Rectal exam: normal sphincter tone, no masses, RV confirms above

## 2023-08-04 NOTE — PATIENT INSTRUCTIONS
Return to office in one year unless having any problems such as breast changes, bleeding, new persistent pain, new progressive bloating, new problems eating (getting full to quickly) or new constant urinary pressure that does not resolve in one week. Call in six months to schedule your annual visit. We recommend either [facility]: [phone] or [address] [place] [address] Drive: [phone] for your colonoscopy.

## 2023-08-04 NOTE — TELEPHONE ENCOUNTER
Patient Name: Monica Loja    Caller name: Monica     Relationship: self    Reason for call: patient had CT done today and wants to know if after Dr. Ho reads it can she decide if MRI is still necessary.  Please contact patientto advise    Callback number: [phone]

## 2023-08-07 NOTE — TELEPHONE ENCOUNTER
Spoke w/ patient.  Advised yes, [facility] would still like for her to complete the MRI.   Patient agreeable.    Thank you

## 2023-08-08 ENCOUNTER — HOSPITAL ENCOUNTER (OUTPATIENT)
Dept: PULMONOLOGY | Facility: HOSPITAL | Age: 63
Discharge: HOME | End: 2023-08-08
Attending: INTERNAL MEDICINE
Payer: COMMERCIAL

## 2023-08-08 PROCEDURE — 94729 DIFFUSING CAPACITY: CPT

## 2023-08-08 PROCEDURE — 94726 PLETHYSMOGRAPHY LUNG VOLUMES: CPT

## 2023-08-08 PROCEDURE — 63700000 HC SELF-ADMINISTRABLE DRUG: Performed by: INTERNAL MEDICINE

## 2023-08-08 PROCEDURE — 94060 EVALUATION OF WHEEZING: CPT

## 2023-08-08 RX ORDER — ALBUTEROL SULFATE 90 UG/1
4 INHALANT RESPIRATORY (INHALATION) ONCE
Status: COMPLETED | OUTPATIENT
Start: 2023-08-08 | End: 2023-08-08

## 2023-08-08 RX ADMIN — ALBUTEROL SULFATE 4 PUFF: 90 AEROSOL, METERED RESPIRATORY (INHALATION) at 08:53

## 2023-08-09 LAB
CYTOLOGIST CVX/VAG CYTO: NORMAL
DX ICD CODE: NORMAL
HPV GENOTYPE REFLEX: NORMAL
HPV I/H RISK 4 DNA CVX QL PROBE+SIG AMP: NEGATIVE
OTHER STN SPEC: NORMAL
PATH REPORT.FINAL DX SPEC: NORMAL
SL AMB NOTE:: NORMAL
SL AMB SPECIMEN ADEQUACY: NORMAL
SL AMB TEST METHODOLOGY: NORMAL

## 2023-09-12 ENCOUNTER — APPOINTMENT (OUTPATIENT)
Dept: RADIOLOGY | Age: 63
End: 2023-09-12
Attending: INTERNAL MEDICINE
Payer: COMMERCIAL

## 2023-09-12 VITALS — BODY MASS INDEX: 23 KG/M2 | WEIGHT: 134 LBS

## 2023-09-12 DIAGNOSIS — R94.31 ABNORMAL EKG: ICD-10-CM

## 2023-09-12 DIAGNOSIS — D86.9 SARCOIDOSIS, UNSPECIFIED: ICD-10-CM

## 2023-09-12 PROCEDURE — G1004 CDSM NDSC: HCPCS

## 2023-09-12 PROCEDURE — A9585 GADOBUTROL INJECTION: HCPCS | Performed by: INTERNAL MEDICINE

## 2023-09-12 RX ORDER — GADOBUTROL 604.72 MG/ML
0.1 INJECTION INTRAVENOUS
Status: COMPLETED | OUTPATIENT
Start: 2023-09-12 | End: 2023-09-12

## 2023-09-12 RX ADMIN — GADOBUTROL 6.1 MMOL: 604.72 INJECTION INTRAVENOUS at 11:23

## 2023-09-13 ENCOUNTER — TELEPHONE (OUTPATIENT)
Dept: SCHEDULING | Facility: CLINIC | Age: 63
End: 2023-09-13
Payer: COMMERCIAL

## 2023-10-03 PROBLEM — Z01.419 ENCOUNTER FOR GYNECOLOGICAL EXAMINATION (GENERAL) (ROUTINE) WITHOUT ABNORMAL FINDINGS: Status: RESOLVED | Noted: 2023-08-04 | Resolved: 2023-10-03

## 2024-06-04 ENCOUNTER — APPOINTMENT (RX ONLY)
Dept: URBAN - METROPOLITAN AREA CLINIC 26 | Facility: CLINIC | Age: 64
Setting detail: DERMATOLOGY
End: 2024-06-04

## 2024-06-04 PROBLEM — D48.5 NEOPLASM OF UNCERTAIN BEHAVIOR OF SKIN: Status: ACTIVE | Noted: 2024-06-04

## 2024-06-04 PROCEDURE — 11104 PUNCH BX SKIN SINGLE LESION: CPT

## 2024-06-04 PROCEDURE — ? BIOPSY BY PUNCH METHOD

## 2024-06-04 NOTE — PROCEDURE: BIOPSY BY PUNCH METHOD
Detail Level: Detailed
Was A Bandage Applied: Yes
Punch Size In Mm: 6
Size Of Lesion In Cm (Optional): 0
Depth Of Punch Biopsy: dermis
Biopsy Type: H and E
Anesthesia Type: 1% lidocaine with epinephrine
Anesthesia Volume In Cc: 1
Hemostasis: None
Epidermal Sutures: 4-0 Chromic Gut
Number Of Epidermal Sutures (Optional): 2
Wound Care: Petrolatum
Dressing: bandage
Patient Will Remove Sutures At Home?: No
Lab: -17
Lab Facility: 3
Consent: Verbal consent was obtained and risks were reviewed including but not limited to scarring, infection, bleeding, scabbing, incomplete removal, nerve damage and allergy to anesthesia.
Post-Care Instructions: I reviewed with the patient in detail post-care instructions. Patient is to keep the biopsy site dry overnight, and then apply Vaseline/Aquaphor twice daily until healed. Patient may apply hydrogen peroxide soaks to remove any crusting.
Home Suture Removal Text: Patient was provided a home suture removal kit and will remove their sutures at home.  If they have any questions or difficulties they will call the office.
Notification Instructions: Patient will be notified of biopsy results. However, patient instructed to call the office if not contacted within 2 weeks.
Billing Type: Third-Party Bill
Information: Selecting Yes will display possible errors in your note based on the variables you have selected. This validation is only offered as a suggestion for you. PLEASE NOTE THAT THE VALIDATION TEXT WILL BE REMOVED WHEN YOU FINALIZE YOUR NOTE. IF YOU WANT TO FAX A PRELIMINARY NOTE YOU WILL NEED TO TOGGLE THIS TO 'NO' IF YOU DO NOT WANT IT IN YOUR FAXED NOTE.

## 2024-06-10 ENCOUNTER — TELEPHONE (OUTPATIENT)
Dept: SCHEDULING | Facility: CLINIC | Age: 64
End: 2024-06-10
Payer: COMMERCIAL

## 2024-06-10 DIAGNOSIS — E78.2 MIXED HYPERLIPIDEMIA: Primary | ICD-10-CM

## 2024-06-10 DIAGNOSIS — R94.31 ABNORMAL EKG: ICD-10-CM

## 2024-06-10 NOTE — TELEPHONE ENCOUNTER
Spoke w/ patient.    Informed will order CMP and fasting Lipids.  She prefers [facility].    Team: Please e-mail copy of lab slips to patient.    Thank you

## 2024-06-27 LAB
ALBUMIN SERPL-MCNC: 4.2 G/DL (ref 3.9–4.9)
ALP SERPL-CCNC: 84 IU/L (ref 44–121)
ALT SERPL-CCNC: 14 IU/L (ref 0–32)
AST SERPL-CCNC: 23 IU/L (ref 0–40)
BILIRUB SERPL-MCNC: 0.4 MG/DL (ref 0–1.2)
BUN SERPL-MCNC: 11 MG/DL (ref 8–27)
BUN/CREAT SERPL: 14 (ref 12–28)
CALCIUM SERPL-MCNC: 10 MG/DL (ref 8.7–10.3)
CHLORIDE SERPL-SCNC: 103 MMOL/L (ref 96–106)
CHOLEST SERPL-MCNC: 221 MG/DL (ref 100–199)
CO2 SERPL-SCNC: 23 MMOL/L (ref 20–29)
CREAT SERPL-MCNC: 0.78 MG/DL (ref 0.57–1)
EGFRCR SERPLBLD CKD-EPI 2021: 85 ML/MIN/1.73
GLOBULIN SER CALC-MCNC: 2.6 G/DL (ref 1.5–4.5)
GLUCOSE SERPL-MCNC: 92 MG/DL (ref 70–99)
HDLC SERPL-MCNC: 63 MG/DL
LDLC SERPL CALC-MCNC: 139 MG/DL (ref 0–99)
POTASSIUM SERPL-SCNC: 4.3 MMOL/L (ref 3.5–5.2)
PROT SERPL-MCNC: 6.8 G/DL (ref 6–8.5)
SODIUM SERPL-SCNC: 139 MMOL/L (ref 134–144)
TRIGL SERPL-MCNC: 105 MG/DL (ref 0–149)
VLDLC SERPL CALC-MCNC: 19 MG/DL (ref 5–40)

## 2024-07-03 NOTE — RESULT ENCOUNTER NOTE
Spoke w/ patient.  Advised CMP stable.  Discussed Lipid results in detail.  Total cholesterol 221 and  still mildly elevated, improving.  Advised triglycerides improved from 156 to 105.  Advised Statin therapy not recommended @ this time since CAC score of zero.  She will continue to incorporate lifestyle modifications as she has been doing.  Advised keep 7/19 OV.  Thank you

## 2024-07-16 ENCOUNTER — APPOINTMENT (RX ONLY)
Dept: URBAN - METROPOLITAN AREA CLINIC 26 | Facility: CLINIC | Age: 64
Setting detail: DERMATOLOGY
End: 2024-07-16

## 2024-07-16 DIAGNOSIS — D18.0 HEMANGIOMA: ICD-10-CM

## 2024-07-16 DIAGNOSIS — L81.4 OTHER MELANIN HYPERPIGMENTATION: ICD-10-CM

## 2024-07-16 DIAGNOSIS — Z85.828 PERSONAL HISTORY OF OTHER MALIGNANT NEOPLASM OF SKIN: ICD-10-CM

## 2024-07-16 DIAGNOSIS — R20.2 PARESTHESIA OF SKIN: ICD-10-CM

## 2024-07-16 DIAGNOSIS — I78.1 NEVUS, NON-NEOPLASTIC: ICD-10-CM

## 2024-07-16 DIAGNOSIS — L82.0 INFLAMED SEBORRHEIC KERATOSIS: ICD-10-CM

## 2024-07-16 DIAGNOSIS — L90.5 SCAR CONDITIONS AND FIBROSIS OF SKIN: ICD-10-CM

## 2024-07-16 DIAGNOSIS — D22 MELANOCYTIC NEVI: ICD-10-CM

## 2024-07-16 DIAGNOSIS — L82.1 OTHER SEBORRHEIC KERATOSIS: ICD-10-CM

## 2024-07-16 PROBLEM — D23.71 OTHER BENIGN NEOPLASM OF SKIN OF RIGHT LOWER LIMB, INCLUDING HIP: Status: ACTIVE | Noted: 2024-07-16

## 2024-07-16 PROBLEM — D18.01 HEMANGIOMA OF SKIN AND SUBCUTANEOUS TISSUE: Status: ACTIVE | Noted: 2024-07-16

## 2024-07-16 PROBLEM — D22.5 MELANOCYTIC NEVI OF TRUNK: Status: ACTIVE | Noted: 2024-07-16

## 2024-07-16 PROCEDURE — ? COUNSELING

## 2024-07-16 PROCEDURE — ? LIQUID NITROGEN

## 2024-07-16 PROCEDURE — ? DEFER

## 2024-07-16 PROCEDURE — ? TREATMENT REGIMEN

## 2024-07-16 PROCEDURE — 99213 OFFICE O/P EST LOW 20 MIN: CPT | Mod: 25

## 2024-07-16 PROCEDURE — ? FULL BODY SKIN EXAM

## 2024-07-16 PROCEDURE — 17110 DESTRUCTION B9 LES UP TO 14: CPT

## 2024-07-16 PROCEDURE — ? SUNSCREEN RECOMMENDATIONS

## 2024-07-16 ASSESSMENT — LOCATION DETAILED DESCRIPTION DERM
LOCATION DETAILED: LEFT SUPERIOR UPPER BACK
LOCATION DETAILED: RIGHT INFERIOR LATERAL UPPER BACK
LOCATION DETAILED: LEFT MID-UPPER BACK
LOCATION DETAILED: RIGHT MEDIAL UPPER BACK
LOCATION DETAILED: LEFT MEDIAL UPPER BACK
LOCATION DETAILED: RIGHT MID-UPPER BACK
LOCATION DETAILED: GLABELLA
LOCATION DETAILED: LEFT SUPERIOR MEDIAL MALAR CHEEK
LOCATION DETAILED: SUPERIOR THORACIC SPINE
LOCATION DETAILED: RIGHT DISTAL CALF

## 2024-07-16 ASSESSMENT — LOCATION SIMPLE DESCRIPTION DERM
LOCATION SIMPLE: UPPER BACK
LOCATION SIMPLE: RIGHT UPPER BACK
LOCATION SIMPLE: GLABELLA
LOCATION SIMPLE: LEFT UPPER BACK
LOCATION SIMPLE: RIGHT CALF
LOCATION SIMPLE: LEFT CHEEK

## 2024-07-16 ASSESSMENT — LOCATION ZONE DERM
LOCATION ZONE: FACE
LOCATION ZONE: TRUNK
LOCATION ZONE: LEG

## 2024-07-16 NOTE — PROCEDURE: DEFER
Size Of Lesion In Cm (Optional): 0
Introduction Text (Please End With A Colon): The following procedure was deferred:
Detail Level: Detailed
Other Procedure: PDL
Scheduling Instructions (Optional): schedule with Karin Landrum PA-C or Dr. Fenton
Procedure To Be Performed At Next Visit: Other

## 2024-07-16 NOTE — PROCEDURE: COUNSELING
Detail Level: Detailed
Detail Level: Generalized
Patient Specific Counseling (Will Not Stick From Patient To Patient): Recommended cosmetic PDL for removal
Detail Level: Simple

## 2024-07-16 NOTE — HPI: EVALUATION OF SKIN LESION(S)
What Type Of Note Output Would You Prefer (Optional)?: Bullet Format
Hpi Title: Evaluation of Skin Lesions
Additional History: Pt has spot of concern on right shoulder blade and left under eye

## 2024-07-16 NOTE — PROCEDURE: MIPS QUALITY
Patient called and aware.  
Quality 226: Preventive Care And Screening: Tobacco Use: Screening And Cessation Intervention: Patient screened for tobacco use and is an ex/non-smoker
Quality 47: Advance Care Plan: Advance Care Planning discussed and documented; advance care plan or surrogate decision maker documented in the medical record.
Detail Level: Detailed
Quality 431: Preventive Care And Screening: Unhealthy Alcohol Use - Screening: Patient not identified as an unhealthy alcohol user when screened for unhealthy alcohol use using a systematic screening method
Quality 130: Documentation Of Current Medications In The Medical Record: Current Medications Documented

## 2024-07-16 NOTE — PROCEDURE: LIQUID NITROGEN
Detail Level: Detailed
Show Topical Anesthesia Variable?: Yes
Application Tool (Optional): Liquid Nitrogen Sprayer
Consent: The patient's verbal consent was obtained including but not limited to risks of crusting, scabbing, blistering, scarring, darker or lighter pigmentary change, recurrence, incomplete removal and infection.
Medical Necessity Information: It is in your best interest to select a reason for this procedure from the list below. All of these items fulfill various CMS LCD requirements except the new and changing color options.
Render Post-Care Instructions In Note?: no
Post-Care Instructions: I reviewed with the patient in detail post-care instructions. Patient is to wear sunprotection, and avoid picking at any of the treated lesions. Pt may apply Vaseline to crusted or scabbing areas.
Duration Of Freeze Thaw-Cycle (Seconds): 25
Number Of Freeze-Thaw Cycles: 2 freeze-thaw cycles
Spray Paint Text: The liquid nitrogen was applied to the skin utilizing a spray paint frosting technique.
Medical Necessity Clause: This procedure was medically necessary because the lesions that were treated were:

## 2024-07-18 NOTE — ASSESSMENT & PLAN NOTE
From 6/26/24: Total cholesterol 221, triglycerides 105, HDL 63, .    Elevated LDL.  Coronary calcium score 2/27/2020: Composite 0.  Estimated 1-year risk of ASCVD 4.3%.   --Statin therapy not currently indicated.  --Lifestyle modification discussed at length, dietary changes stressed.

## 2024-07-18 NOTE — ASSESSMENT & PLAN NOTE
Cardiac MRI 9/13/23 without evidence of cardiac sarcoidosis.   Asymptomatic.  -- No further cardiac evaluation at this time.

## 2024-07-19 ENCOUNTER — OFFICE VISIT (OUTPATIENT)
Dept: CARDIOLOGY | Facility: CLINIC | Age: 64
End: 2024-07-19
Payer: COMMERCIAL

## 2024-07-19 VITALS — DIASTOLIC BLOOD PRESSURE: 76 MMHG | SYSTOLIC BLOOD PRESSURE: 128 MMHG

## 2024-07-19 DIAGNOSIS — E78.2 MIXED HYPERLIPIDEMIA: ICD-10-CM

## 2024-07-19 DIAGNOSIS — D86.9 SARCOIDOSIS, UNSPECIFIED: Primary | ICD-10-CM

## 2024-07-19 PROCEDURE — 93000 ELECTROCARDIOGRAM COMPLETE: CPT | Performed by: INTERNAL MEDICINE

## 2024-07-19 PROCEDURE — 99213 OFFICE O/P EST LOW 20 MIN: CPT | Performed by: INTERNAL MEDICINE

## 2024-08-12 ENCOUNTER — APPOINTMENT (RX ONLY)
Dept: URBAN - METROPOLITAN AREA CLINIC 23 | Facility: CLINIC | Age: 64
Setting detail: DERMATOLOGY
End: 2024-08-12

## 2024-08-12 DIAGNOSIS — L82.0 INFLAMED SEBORRHEIC KERATOSIS: ICD-10-CM

## 2024-08-12 PROCEDURE — 17110 DESTRUCTION B9 LES UP TO 14: CPT

## 2024-08-12 PROCEDURE — ? COUNSELING

## 2024-08-12 PROCEDURE — ? LIQUID NITROGEN

## 2024-08-12 ASSESSMENT — LOCATION DETAILED DESCRIPTION DERM
LOCATION DETAILED: RIGHT CENTRAL LATERAL NECK
LOCATION DETAILED: LEFT SUPERIOR MEDIAL MALAR CHEEK

## 2024-08-12 ASSESSMENT — LOCATION SIMPLE DESCRIPTION DERM
LOCATION SIMPLE: LEFT CHEEK
LOCATION SIMPLE: NECK

## 2024-08-12 ASSESSMENT — LOCATION ZONE DERM
LOCATION ZONE: NECK
LOCATION ZONE: FACE

## 2024-08-12 NOTE — PROCEDURE: LIQUID NITROGEN
Add 52 Modifier (Optional): no
Pared With?: 15 blade
Consent: The patient's verbal consent was obtained including but not limited to risks of crusting, scabbing, blistering, scarring, darker or lighter pigmentary change, recurrence, incomplete removal and infection.
Show Spray Paint Technique Variable?: Yes
Detail Level: Detailed
Medical Necessity Information: It is in your best interest to select a reason for this procedure from the list below. All of these items fulfill various CMS LCD requirements except the new and changing color options.
Post-Care Instructions: I reviewed with the patient in detail post-care instructions. Patient is to wear sunprotection, and avoid picking at any of the treated lesions. Pt may apply Vaseline to crusted or scabbing areas.
Medical Necessity Clause: This procedure was medically necessary because the lesions that were treated were:
Spray Paint Text: The liquid nitrogen was applied to the skin utilizing a spray paint frosting technique.
Application Tool (Optional): Liquid Nitrogen Sprayer
Duration Of Freeze Thaw-Cycle (Seconds): 25

## 2024-09-10 NOTE — PROGRESS NOTES
Assessment/Plan:    Encounter for gynecological examination (general) (routine) without abnormal findings  Here for well check, feels well, no breast or gyn complaints.  Normal breast and pelvic exams.  Last pap 8/4/23 neg/HPV neg; due by 2028  Mammo order given, last 10/2022; encouraged, would be hesitant to treat cancer.  Colonoscopy 2024, due 2034. EGD 2024, hiatal hernia noted per pt.  Dexa with rheumatology/PCP       Diagnoses and all orders for this visit:    Encounter for gynecological examination (general) (routine) without abnormal findings    Age-related osteoporosis without current pathological fracture    Encounter for screening mammogram for malignant neoplasm of breast  -     Mammo screening bilateral w 3d and cad; Future          Subjective:      Patient ID: Fang Henry is a 64 y.o. female.    HPI Here for well check.    The following portions of the patient's history were reviewed and updated as appropriate: She  has a past medical history of Osteoporosis without current pathological fracture, Sarcoidosis, lung (HCC), and Vertigo.  She   Patient Active Problem List    Diagnosis Date Noted    Osteoporosis without current pathological fracture 08/02/2023     She  has a past surgical history that includes Mammo (historical) (10/2022); DXA procedure(historical) (07/2022); Tonsillectomy; and Colonoscopy (07/2024).  Her family history includes Diabetes in her father and mother; Osteoporosis in her mother; Thyroid disease in her mother and sister; Uterine cancer in her mother.  She  reports that she has never smoked. She has never been exposed to tobacco smoke. She has never used smokeless tobacco. She reports that she does not drink alcohol and does not use drugs.  Current Outpatient Medications   Medication Sig Dispense Refill    Multiple Vitamin (MULTIVITAMIN ADULT PO) Take by mouth daily      Multiple Vitamins-Minerals (MEGAVITE FRUITS & VEGGIES PO) Take by mouth      zoledronic acid (Reclast) 5  "mg/100 mL IV infusion (premix) Inject into a catheter in a vein       No current facility-administered medications for this visit.     She is allergic to penicillins..    Review of Systems  No PMB, breast, bladder, bowel changes. No new persistent pain, bloating, early satiety or pelvic pressure    Objective:    /68 (BP Location: Right arm, Patient Position: Sitting, Cuff Size: Standard)   Ht 5' 3.5\" (1.613 m)   Wt 61.7 kg (136 lb)   BMI 23.71 kg/m²      Physical Exam    General appearance: no distress, pleasant  Neck: thyroid without nodules or thyromegaly, no palpable adenopathy  Lymph nodes: no palpable adenopathy  Breasts: no masses, nodes or skin changes  Abdomen: soft, non tender, no palpable masses  Pelvic exam: normal atrophic external genitalia, urethral meatus normal, vagina atrophic without lesions, cervix atrophic without lesions, uterus small, non tender, no adnexal masses, non tender  Rectal exam: normal sphincter tone, no masses, RV confirms above    "

## 2024-09-17 ENCOUNTER — ANNUAL EXAM (OUTPATIENT)
Dept: OBGYN CLINIC | Facility: CLINIC | Age: 64
End: 2024-09-17
Payer: COMMERCIAL

## 2024-09-17 VITALS
WEIGHT: 136 LBS | BODY MASS INDEX: 23.22 KG/M2 | SYSTOLIC BLOOD PRESSURE: 126 MMHG | HEIGHT: 64 IN | DIASTOLIC BLOOD PRESSURE: 68 MMHG

## 2024-09-17 DIAGNOSIS — M81.0 AGE-RELATED OSTEOPOROSIS WITHOUT CURRENT PATHOLOGICAL FRACTURE: ICD-10-CM

## 2024-09-17 DIAGNOSIS — Z12.31 ENCOUNTER FOR SCREENING MAMMOGRAM FOR MALIGNANT NEOPLASM OF BREAST: ICD-10-CM

## 2024-09-17 DIAGNOSIS — Z01.419 ENCOUNTER FOR GYNECOLOGICAL EXAMINATION (GENERAL) (ROUTINE) WITHOUT ABNORMAL FINDINGS: Primary | ICD-10-CM

## 2024-09-17 PROCEDURE — S0612 ANNUAL GYNECOLOGICAL EXAMINA: HCPCS | Performed by: OBSTETRICS & GYNECOLOGY

## 2024-09-17 NOTE — LETTER
September 17, 2024     [facility]  [address] [place] [id number]      Patient: Fang Henry   YOB: 1960   Date of Visit: 9/17/2024       Dear Doctors:    Fang Henry was in today for her annual gyn exam. Below are my notes for this visit.    If you have questions, please do not hesitate to call me. I look forward to following your patient along with you.         Sincerely,        Kelly Flanagan MD        CC: No Recipients    Kelly Flanagan MD  9/17/2024 10:44 AM  Sign when Signing Visit  Assessment/Plan:    Encounter for gynecological examination (general) (routine) without abnormal findings  Here for well check, feels well, no breast or gyn complaints.  Normal breast and pelvic exams.  Last pap 8/4/23 neg/HPV neg; due by 2028  Mammo order given, last 10/2022; encouraged, would be hesitant to treat cancer.  Colonoscopy 2024, due 2034. EGD 2024, hiatal hernia noted per pt.  Dexa with rheumatology/PCP       Diagnoses and all orders for this visit:    Encounter for gynecological examination (general) (routine) without abnormal findings    Age-related osteoporosis without current pathological fracture    Encounter for screening mammogram for malignant neoplasm of breast  -     Mammo screening bilateral w 3d and cad; Future          Subjective:      Patient ID: Fang Henry is a 64 y.o. female.    HPI Here for well check.    The following portions of the patient's history were reviewed and updated as appropriate: She  has a past medical history of Osteoporosis without current pathological fracture, Sarcoidosis, lung (HCC), and Vertigo.  She   Patient Active Problem List    Diagnosis Date Noted   • Osteoporosis without current pathological fracture 08/02/2023     She  has a past surgical history that includes Mammo (historical) (10/2022); DXA procedure(historical) (07/2022); Tonsillectomy; and Colonoscopy (07/2024).  Her family history includes Diabetes in her father and mother;  "Osteoporosis in her mother; Thyroid disease in her mother and sister; Uterine cancer in her mother.  She  reports that she has never smoked. She has never been exposed to tobacco smoke. She has never used smokeless tobacco. She reports that she does not drink alcohol and does not use drugs.  Current Outpatient Medications   Medication Sig Dispense Refill   • Multiple Vitamin (MULTIVITAMIN ADULT PO) Take by mouth daily     • Multiple Vitamins-Minerals (MEGAVITE FRUITS & VEGGIES PO) Take by mouth     • zoledronic acid (Reclast) 5 mg/100 mL IV infusion (premix) Inject into a catheter in a vein       No current facility-administered medications for this visit.     She is allergic to penicillins..    Review of Systems  No PMB, breast, bladder, bowel changes. No new persistent pain, bloating, early satiety or pelvic pressure    Objective:    /68 (BP Location: Right arm, Patient Position: Sitting, Cuff Size: Standard)   Ht 5' 3.5\" (1.613 m)   Wt 61.7 kg (136 lb)   BMI 23.71 kg/m²      Physical Exam    General appearance: no distress, pleasant  Neck: thyroid without nodules or thyromegaly, no palpable adenopathy  Lymph nodes: no palpable adenopathy  Breasts: no masses, nodes or skin changes  Abdomen: soft, non tender, no palpable masses  Pelvic exam: normal atrophic external genitalia, urethral meatus normal, vagina atrophic without lesions, cervix atrophic without lesions, uterus small, non tender, no adnexal masses, non tender  Rectal exam: normal sphincter tone, no masses, RV confirms above    "

## 2024-09-17 NOTE — PATIENT INSTRUCTIONS
Return to office in one year unless having any problems such as breast changes, bleeding, new persistent pain, new progressive bloating, new problems eating (getting full to quickly) or new constant urinary pressure that does not resolve in one week.    Continue to strive for 1200 mg of calcium and 1000 IU of vitamin D daily in diet and supplements combined for your bone health.  You can only absorb 600 mg of calcium at a time. Avoid excess calcium as this may adversely effect your heart.  There are 400 mg of calcium in an 8 oz serving of dairy.  [place] milk has 600 mg of calcium in an 8 oz serving.

## 2024-09-17 NOTE — ASSESSMENT & PLAN NOTE
Here for well check, feels well, no breast or gyn complaints.  Normal breast and pelvic exams.  Last pap 8/4/23 neg/HPV neg; due by 2028  Mammo order given, last 10/2022; encouraged, would be hesitant to treat cancer.  Colonoscopy 2024, due 2034. EGD 2024, hiatal hernia noted per pt.  Dexa with rheumatology/PCP

## 2024-09-18 ENCOUNTER — TRANSCRIBE ORDERS (OUTPATIENT)
Dept: SCHEDULING | Age: 64
End: 2024-09-18

## 2024-09-18 DIAGNOSIS — Z12.31 ENCOUNTER FOR SCREENING MAMMOGRAM FOR MALIGNANT NEOPLASM OF BREAST: Primary | ICD-10-CM

## 2024-09-18 DIAGNOSIS — M81.0 AGE-RELATED OSTEOPOROSIS WITHOUT CURRENT PATHOLOGICAL FRACTURE: Primary | ICD-10-CM

## 2024-10-08 NOTE — HPI: EVALUATION OF SKIN LESION(S)
Jewels Almeida, NP   Called requesting to speak with Ob RN due to visit today an plans for Uterine prolapse for OBGYN looking to discuss    Writer did schedule off referral next available     Please give NP a call back [phone]  
What Type Of Note Output Would You Prefer (Optional)?: Bullet Format
Hpi Title: Evaluation of Skin Lesions

## 2024-10-25 ENCOUNTER — APPOINTMENT (RX ONLY)
Dept: URBAN - METROPOLITAN AREA CLINIC 26 | Facility: CLINIC | Age: 64
Setting detail: DERMATOLOGY
End: 2024-10-25

## 2024-10-25 DIAGNOSIS — L82.0 INFLAMED SEBORRHEIC KERATOSIS: ICD-10-CM

## 2024-10-25 DIAGNOSIS — L57.0 ACTINIC KERATOSIS: ICD-10-CM

## 2024-10-25 PROCEDURE — 17000 DESTRUCT PREMALG LESION: CPT | Mod: 59

## 2024-10-25 PROCEDURE — ? PRESCRIPTION

## 2024-10-25 PROCEDURE — 17110 DESTRUCTION B9 LES UP TO 14: CPT

## 2024-10-25 PROCEDURE — 17003 DESTRUCT PREMALG LES 2-14: CPT | Mod: 59

## 2024-10-25 PROCEDURE — ? PRESCRIPTION MEDICATION MANAGEMENT

## 2024-10-25 PROCEDURE — ? LIQUID NITROGEN

## 2024-10-25 PROCEDURE — ? COUNSELING

## 2024-10-25 RX ORDER — FLUOROURACIL 5 MG/G
CREAM TOPICAL
Qty: 40 | Refills: 1 | Status: ERX | COMMUNITY
Start: 2024-10-25

## 2024-10-25 RX ADMIN — FLUOROURACIL: 5 CREAM TOPICAL at 00:00

## 2024-10-25 ASSESSMENT — LOCATION SIMPLE DESCRIPTION DERM
LOCATION SIMPLE: LEFT CHEEK
LOCATION SIMPLE: CHEST

## 2024-10-25 ASSESSMENT — LOCATION ZONE DERM
LOCATION ZONE: TRUNK
LOCATION ZONE: FACE

## 2024-10-25 ASSESSMENT — LOCATION DETAILED DESCRIPTION DERM
LOCATION DETAILED: LEFT CENTRAL MALAR CHEEK
LOCATION DETAILED: RIGHT MEDIAL SUPERIOR CHEST
LOCATION DETAILED: LEFT MEDIAL SUPERIOR CHEST

## 2024-10-25 NOTE — PROCEDURE: LIQUID NITROGEN
Render Note In Bullet Format When Appropriate: No
Number Of Freeze-Thaw Cycles: 2 freeze-thaw cycles
Show Applicator Variable?: Yes
Duration Of Freeze Thaw-Cycle (Seconds): 15
Detail Level: Detailed
Application Tool (Optional): Liquid Nitrogen Sprayer
Post-Care Instructions: I reviewed with the patient in detail post-care instructions. Patient is to wear sunprotection, and avoid picking at any of the treated lesions. Pt may apply Vaseline to crusted or scabbing areas.
Consent: The patient's verbal consent was obtained including but not limited to risks of crusting, scabbing, blistering, scarring, darker or lighter pigmentary change, recurrence, incomplete removal and infection. Verbal consent also obtained.
Duration Of Freeze Thaw-Cycle (Seconds): 25
Spray Paint Text: The liquid nitrogen was applied to the skin utilizing a spray paint frosting technique.
Medical Necessity Information: It is in your best interest to select a reason for this procedure from the list below. All of these items fulfill various CMS LCD requirements except the new and changing color options.
Consent: The patient's verbal consent was obtained including but not limited to risks of crusting, scabbing, blistering, scarring, darker or lighter pigmentary change, recurrence, incomplete removal and infection.
Medical Necessity Clause: This procedure was medically necessary because the lesions that were treated were:

## 2024-10-25 NOTE — PROCEDURE: PRESCRIPTION MEDICATION MANAGEMENT
Detail Level: Zone
Initiate Treatment: fluorouracil 5 % topical cream: Apply to AA on left cheek bid x 2-4 weeks.
Render In Strict Bullet Format?: No
Plan: Pt had adverse reaction to fluorouracil in the past(cold sore). Declines antiviral at this time. Discussed increased efficacy of using 5FU in combination with LN2, since the lesion has been treated previously.\\n\\nLeft cheek biopsied twice 2018 and 22? Solar keratosis. Done ln2 multiple times and efudex

## 2024-11-08 ENCOUNTER — HOSPITAL ENCOUNTER (OUTPATIENT)
Dept: RADIOLOGY | Age: 64
Discharge: HOME | End: 2024-11-08
Attending: OBSTETRICS & GYNECOLOGY
Payer: COMMERCIAL

## 2024-11-08 ENCOUNTER — HOSPITAL ENCOUNTER (OUTPATIENT)
Dept: RADIOLOGY | Age: 64
Discharge: HOME | End: 2024-11-08
Attending: INTERNAL MEDICINE
Payer: COMMERCIAL

## 2024-11-08 DIAGNOSIS — Z12.31 ENCOUNTER FOR SCREENING MAMMOGRAM FOR MALIGNANT NEOPLASM OF BREAST: ICD-10-CM

## 2024-11-08 DIAGNOSIS — M81.0 AGE-RELATED OSTEOPOROSIS WITHOUT CURRENT PATHOLOGICAL FRACTURE: ICD-10-CM

## 2024-11-08 PROCEDURE — 77080 DXA BONE DENSITY AXIAL: CPT

## 2024-11-08 PROCEDURE — 77063 BREAST TOMOSYNTHESIS BI: CPT

## 2025-06-25 ENCOUNTER — TELEPHONE (OUTPATIENT)
Dept: SCHEDULING | Facility: CLINIC | Age: 65
End: 2025-06-25
Payer: COMMERCIAL

## 2025-06-25 DIAGNOSIS — E78.2 MIXED HYPERLIPIDEMIA: Primary | ICD-10-CM

## 2025-06-25 NOTE — TELEPHONE ENCOUNTER
Last set of labs 6/2024 was CMP and Lipids.    Agreeable to repeat CMP and Lipids for 7/22/25 OV?  Or if wish for any additional labs?      Thank you

## 2025-06-25 NOTE — TELEPHONE ENCOUNTER
Pt called wants to confirm if Dr Ho want Pt to have labs prior to OV on 7/22/25 please advise    Pt use [facility] for Labs    Pt can be reached at: [phone]

## 2025-06-25 NOTE — TELEPHONE ENCOUNTER
CMP and Lipid panel ordered.      I spoke w/ patient.  Advised CMP and Lipids ordered to [facility] and if able to complete before 7/22 OV.    Advised fasting at least 8 hours before.    AD/BL: Please mail hard copy of CMP and Lipid panel lab slips to patient.    Thank you

## 2025-07-01 LAB
ALBUMIN SERPL-MCNC: 4.5 G/DL (ref 3.9–4.9)
ALP SERPL-CCNC: 77 IU/L (ref 44–121)
ALT SERPL-CCNC: 14 IU/L (ref 0–32)
AST SERPL-CCNC: 18 IU/L (ref 0–40)
BILIRUB SERPL-MCNC: 0.4 MG/DL (ref 0–1.2)
BUN SERPL-MCNC: 17 MG/DL (ref 8–27)
BUN/CREAT SERPL: 21 (ref 12–28)
CALCIUM SERPL-MCNC: 9.9 MG/DL (ref 8.7–10.3)
CHLORIDE SERPL-SCNC: 101 MMOL/L (ref 96–106)
CHOLEST SERPL-MCNC: 250 MG/DL (ref 100–199)
CO2 SERPL-SCNC: 22 MMOL/L (ref 20–29)
CREAT SERPL-MCNC: 0.82 MG/DL (ref 0.57–1)
EGFRCR SERPLBLD CKD-EPI 2021: 80 ML/MIN/1.73
GLOBULIN SER CALC-MCNC: 2.3 G/DL (ref 1.5–4.5)
GLUCOSE SERPL-MCNC: 96 MG/DL (ref 70–99)
HDLC SERPL-MCNC: 65 MG/DL
LDLC SERPL CALC-MCNC: 170 MG/DL (ref 0–99)
POTASSIUM SERPL-SCNC: 4.7 MMOL/L (ref 3.5–5.2)
PROT SERPL-MCNC: 6.8 G/DL (ref 6–8.5)
SODIUM SERPL-SCNC: 138 MMOL/L (ref 134–144)
TRIGL SERPL-MCNC: 86 MG/DL (ref 0–149)
VLDLC SERPL CALC-MCNC: 15 MG/DL (ref 5–40)

## 2025-07-10 ENCOUNTER — TELEPHONE (OUTPATIENT)
Dept: SCHEDULING | Facility: CLINIC | Age: 65
End: 2025-07-10
Payer: COMMERCIAL

## 2025-07-10 NOTE — TELEPHONE ENCOUNTER
Please see prior.  Spoke with patient.  States her rheumatologist wants her to take Vit D.  States cardiology or pulmonology told her no Vit D with sarcoid.  Please advise if any issues from cardiac standpoint with Vit D supplementation.      I reviewed her CMP/Lipid results while on phone.  Chol/LDL very high,patient declines statins.  States her #'s are high due to her Prolia injection.  CMP results good.

## 2025-07-10 NOTE — TELEPHONE ENCOUNTER
Pt called, wants to speak with a nurse as she has questions regarding taking Vitamin D supplements.    Pt can be reached at [phone]

## 2025-07-15 ENCOUNTER — APPOINTMENT (OUTPATIENT)
Dept: URBAN - METROPOLITAN AREA CLINIC 26 | Facility: CLINIC | Age: 65
Setting detail: DERMATOLOGY
End: 2025-07-15

## 2025-07-15 DIAGNOSIS — D22 MELANOCYTIC NEVI: ICD-10-CM

## 2025-07-15 DIAGNOSIS — L57.0 ACTINIC KERATOSIS: ICD-10-CM

## 2025-07-15 DIAGNOSIS — L81.4 OTHER MELANIN HYPERPIGMENTATION: ICD-10-CM

## 2025-07-15 DIAGNOSIS — D18.0 HEMANGIOMA: ICD-10-CM

## 2025-07-15 DIAGNOSIS — D485 NEOPLASM OF UNCERTAIN BEHAVIOR OF SKIN: ICD-10-CM

## 2025-07-15 DIAGNOSIS — L82.1 OTHER SEBORRHEIC KERATOSIS: ICD-10-CM

## 2025-07-15 DIAGNOSIS — Z85.828 PERSONAL HISTORY OF OTHER MALIGNANT NEOPLASM OF SKIN: ICD-10-CM

## 2025-07-15 PROBLEM — D23.72 OTHER BENIGN NEOPLASM OF SKIN OF LEFT LOWER LIMB, INCLUDING HIP: Status: ACTIVE | Noted: 2025-07-15

## 2025-07-15 PROBLEM — D23.9 OTHER BENIGN NEOPLASM OF SKIN, UNSPECIFIED: Status: ACTIVE | Noted: 2025-07-15

## 2025-07-15 PROBLEM — D18.01 HEMANGIOMA OF SKIN AND SUBCUTANEOUS TISSUE: Status: ACTIVE | Noted: 2025-07-15

## 2025-07-15 PROBLEM — D48.5 NEOPLASM OF UNCERTAIN BEHAVIOR OF SKIN: Status: ACTIVE | Noted: 2025-07-15

## 2025-07-15 PROBLEM — D22.5 MELANOCYTIC NEVI OF TRUNK: Status: ACTIVE | Noted: 2025-07-15

## 2025-07-15 PROCEDURE — ? SUNSCREEN RECOMMENDATIONS

## 2025-07-15 PROCEDURE — ? COUNSELING

## 2025-07-15 PROCEDURE — ? BIOPSY BY SHAVE METHOD

## 2025-07-15 PROCEDURE — ? FULL BODY SKIN EXAM

## 2025-07-15 PROCEDURE — ? LIQUID NITROGEN

## 2025-07-15 ASSESSMENT — LOCATION DETAILED DESCRIPTION DERM
LOCATION DETAILED: SUPERIOR THORACIC SPINE
LOCATION DETAILED: NASAL SUPRATIP
LOCATION DETAILED: RIGHT SUPERIOR CRUS OF ANTIHELIX
LOCATION DETAILED: LEFT SUPERIOR MEDIAL MALAR CHEEK
LOCATION DETAILED: NASAL DORSUM
LOCATION DETAILED: RIGHT POSTERIOR SHOULDER

## 2025-07-15 ASSESSMENT — LOCATION ZONE DERM
LOCATION ZONE: FACE
LOCATION ZONE: EAR
LOCATION ZONE: NOSE
LOCATION ZONE: TRUNK
LOCATION ZONE: ARM

## 2025-07-15 ASSESSMENT — LOCATION SIMPLE DESCRIPTION DERM
LOCATION SIMPLE: RIGHT SHOULDER
LOCATION SIMPLE: UPPER BACK
LOCATION SIMPLE: RIGHT EAR
LOCATION SIMPLE: LEFT CHEEK
LOCATION SIMPLE: NOSE

## 2025-07-15 NOTE — PROCEDURE: LIQUID NITROGEN
Render Note In Bullet Format When Appropriate: No
Number Of Freeze-Thaw Cycles: 2 freeze-thaw cycles
Show Applicator Variable?: Yes
Duration Of Freeze Thaw-Cycle (Seconds): 15
Detail Level: Detailed
Application Tool (Optional): Liquid Nitrogen Sprayer
Post-Care Instructions: I reviewed with the patient in detail post-care instructions. Patient is to wear sunprotection, and avoid picking at any of the treated lesions. Pt may apply Vaseline to crusted or scabbing areas.
Consent: The patient's verbal consent was obtained including but not limited to risks of crusting, scabbing, blistering, scarring, darker or lighter pigmentary change, recurrence, incomplete removal and infection. Verbal consent also obtained.

## 2025-07-22 ENCOUNTER — OFFICE VISIT (OUTPATIENT)
Dept: CARDIOLOGY | Facility: CLINIC | Age: 65
End: 2025-07-22
Payer: COMMERCIAL

## 2025-07-22 VITALS
HEIGHT: 64 IN | DIASTOLIC BLOOD PRESSURE: 76 MMHG | OXYGEN SATURATION: 99 % | BODY MASS INDEX: 22.88 KG/M2 | WEIGHT: 134 LBS | HEART RATE: 73 BPM | RESPIRATION RATE: 18 BRPM | SYSTOLIC BLOOD PRESSURE: 124 MMHG

## 2025-07-22 DIAGNOSIS — E78.2 MIXED HYPERLIPIDEMIA: Primary | ICD-10-CM

## 2025-07-22 LAB
ATRIAL RATE: 73
P AXIS: 64
PR INTERVAL: 134
QRS DURATION: 84
QT INTERVAL: 374
QTC CALCULATION(BAZETT): 412
R AXIS: 19
T WAVE AXIS: 12
VENTRICULAR RATE: 73

## 2025-07-22 NOTE — PROGRESS NOTES
Daphney Ho MD, [facility]  Cardiology    [facility]    [facility]  [facility] [address]  [address] [place] [address]    TEL  [phone]  [facility]     2025    Reason for visit: Cardiovascular follow-up.    Monica Loja is a 65 y.o. female who presents for cardiovascular follow-up. Monica has a history of hyperlipidemia, premature menopause, and pulmonary sarcoidosis.  I last saw her in the office on 2024 at which time she was feeling well.     Interval history obtained from the patient and review of all available medical records.  She had updated lipids completed 2025 notable for an LDL of 174. WBC was elevated. She will have a lung scan next week.     Today, Monica reports feeling well from a cardiovascular standpoint.   She is going to be a grandmother in October!  We reviewed her labs. Lipids high. She states diet has not been great, cheese and hamburgers. She will work on changes. She is not interested in lipid lowering therapy.   She is without chest pain, shortness of breath, orthopnea, PND, lower extremity edema, palpitations, dizziness, lightheadedness, or syncope.   She is now seeing Dr.Sutter pulmonary. She is on no therapy for sarcoidosis.   She is exercising, walks a lot.     Past Medical History:  Pulmonary sarcoidosis  Hyperlipidemia  Osteoporosis  Premature menopause  Obstetric history:   labor    Past Surgical History:  Tonsillectomy  Pulmonary biopsy    Medications: denosumab SQ as directed, vitamin D, fish oil.    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     Family History: Mother has hyperlipidemia and diabetes.     Exam:  Objective   Vitals:    25 0823   BP: 124/76   Pulse: 73   Resp: 18   SpO2: 99%   Body mass index is 23 kg/m².  Physical Exam  Constitutional:       General: She is not in acute  distress.  Cardiovascular:      Rate and Rhythm: Normal rate.      Pulses: Normal pulses.      Heart sounds: No murmur heard.  Pulmonary:      Effort: Pulmonary effort is normal. No respiratory distress.      Breath sounds: Normal breath sounds.   Musculoskeletal:      Right lower leg: No edema.      Left lower leg: No edema.   Skin:     General: Skin is warm and dry.   Neurological:      Mental Status: She is alert.         Labs:  Lab Results   Component Value Date    CHOL 250 (H) 07/01/2025    TRIG 86 07/01/2025    HDL 65 07/01/2025    ALT 14 07/01/2025    AST 18 07/01/2025     07/01/2025    K 4.7 07/01/2025    CREATININE 0.82 07/01/2025    HGBA1C 5.4 03/27/2023      Personally reviewed, my interpretation: elevated LDL measuring 174.     Cardiovascular Studies:   Cardiac MRI 9/13/23: Normal left and right atrial size. Normal left ventricular cavity size and wall thickness with preserved systolic function. Left ventricular ejection fraction 60%. No regional wall motion abnormalities. Normal right ventricular cavity size with preserved systolic function. Right ventricular ejection fraction 68%. No evidence of late gadolinium enhancement, consistent with absence of fibrosis or scar. No significant aortic regurgitation. Mild mitral regurgitation. Trace pulmonic regurgitation. Mild tricuspid regurgitation.  Echocardiogram 7/16/2020 (personally reviewed): Normal left ventricular cavity size with low normal systolic function, EF 50 to 55%.  No regional wall motion abnormalities.  Normal right ventricular size with preserved systolic function.  Normal left and right atrial size.  Trileaflet aortic valve with mild sclerosis.  Trace mitral regurgitation.  Trace pulmonary regurgitation.  Mild tricuspid regurgitation.  Estimated RVSP 19 mmHg.  No pericardial effusion.  Coronary calcium score 2/27/2020: Coronary calcium score of 0.    ECG from today personally reviewed and discussed with the patient shows sinus rhythm,  normal tracing.     Assessment/Plan   Problem List Items Addressed This Visit       Mixed hyperlipidemia - Primary    From 7/1/2025: Total cholesterol 250, triglycerides 86, HDL 65, .  Elevated LDL.  Coronary calcium score 2/27/2020: Composite 0.  Estimated 1-year risk of ASCVD 5%.   --Lipid lowering therapy not currently indicated.  --Lifestyle modification discussed at length, dietary changes stressed.  --Repeat lipids in 6 months with lifestyle modification.   --Repeat calcium score given 5 years from prior.          Relevant Orders    [facility] MUSE ECG 12 lead (clinic performed) (Completed)    Lipid panel    CT HEART CORONARY ARTERY CALCIUM SCORE WITHOUT IV CONTRAST              This letter was generated using speech recognition software. Please excuse any typographical errors.    Return in about 1 year (around 7/22/2026).        Daphney Ho MD, FACC

## 2025-07-22 NOTE — ASSESSMENT & PLAN NOTE
From 7/1/2025: Total cholesterol 250, triglycerides 86, HDL 65, .  Elevated LDL.  Coronary calcium score 2/27/2020: Composite 0.  Estimated 1-year risk of ASCVD 5%.   --Lipid lowering therapy not currently indicated.  --Lifestyle modification discussed at length, dietary changes stressed.  --Repeat lipids in 6 months with lifestyle modification.   --Repeat calcium score given 5 years from prior.

## 2025-07-25 ENCOUNTER — HOSPITAL ENCOUNTER (OUTPATIENT)
Dept: RADIOLOGY | Age: 65
Discharge: HOME | End: 2025-07-25
Attending: NURSE PRACTITIONER
Payer: COMMERCIAL

## 2025-07-25 DIAGNOSIS — D86.9 SARCOIDOSIS: ICD-10-CM

## 2025-07-25 PROCEDURE — 71250 CT THORAX DX C-: CPT

## 2025-07-30 ENCOUNTER — HOSPITAL ENCOUNTER (OUTPATIENT)
Dept: RADIOLOGY | Age: 65
Discharge: HOME | End: 2025-07-30
Attending: INTERNAL MEDICINE

## 2025-07-30 DIAGNOSIS — E78.2 MIXED HYPERLIPIDEMIA: ICD-10-CM

## 2025-07-30 PROCEDURE — 75571 CT HRT W/O DYE W/CA TEST: CPT

## 2025-08-19 ENCOUNTER — APPOINTMENT (OUTPATIENT)
Dept: URBAN - METROPOLITAN AREA CLINIC 26 | Facility: CLINIC | Age: 65
Setting detail: DERMATOLOGY
End: 2025-08-19

## 2025-08-19 PROBLEM — C44.311 BASAL CELL CARCINOMA OF SKIN OF NOSE: Status: ACTIVE | Noted: 2025-08-19

## 2025-08-19 PROCEDURE — ? MOHS SURGERY

## 2025-08-19 PROCEDURE — ? CONSULTATION FOR MOHS SURGERY

## (undated) DEVICE — SPECIMEN TRAP MUCOUS 40CC

## (undated) DEVICE — STOPCOCK THREE WAY LUER LOK

## (undated) DEVICE — GOWN SURGICAL REINFORCED LARGE

## (undated) DEVICE — SYRINGE DISP LUER-LOK 20 CC

## (undated) DEVICE — SYRINGE DISP LUER-LOK 10 CC

## (undated) DEVICE — SYRINGE FLUSH NACL 10ML

## (undated) DEVICE — TIP SUCTION YANKAUER

## (undated) DEVICE — Device

## (undated) DEVICE — TUBE SUCTION 1/4INX20FT STERILE

## (undated) DEVICE — SYRINGE 50CC NO NEEDLE ST

## (undated) DEVICE — MOUTHPIECE 60FR ENDO BITEBLOCK